# Patient Record
Sex: FEMALE | Employment: STUDENT | URBAN - METROPOLITAN AREA
[De-identification: names, ages, dates, MRNs, and addresses within clinical notes are randomized per-mention and may not be internally consistent; named-entity substitution may affect disease eponyms.]

---
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
## 2019-01-04 DIAGNOSIS — Z30.41 ENCOUNTER FOR SURVEILLANCE OF CONTRACEPTIVE PILLS: ICD-10-CM

## 2019-01-07 ENCOUNTER — TELEPHONE (OUTPATIENT)
Dept: FAMILY MEDICINE CLINIC | Facility: CLINIC | Age: 19
End: 2019-01-07

## 2019-01-07 NOTE — TELEPHONE ENCOUNTER
[facility] for patient's mother. Advised of Dr. Kamilla Wu of care and that there is no value in adding on Mono blood test.  Advised to call and schedule appointment for her daughter for this week to address all issues including her prescription.

## 2019-01-07 NOTE — TELEPHONE ENCOUNTER
Dr. Fadia Montelongo, Please advise if repeat mono test is needed    LAST LAB 6/18/18  Mono test positive    Notes recorded by Izabella Catherine MD on 6/19/2018 at 7:16 AM CDT  Pt has positive mono test but it was also positive 2 years ago, so it could be a false

## 2019-01-07 NOTE — TELEPHONE ENCOUNTER
Patient had to cancel annual physical this morning due to throwing up and being sick, mom called and stated that patient will be needing refill on birth control and goes back to school on Sunday, so will need before.  Also stated that patient needs iron and

## 2019-01-07 NOTE — TELEPHONE ENCOUNTER
Mono antibody tests are \"once positive, always positive. \" There's no recovery test. When symptoms resolve they are considered cured. It's probably best to address blood test orders at a visit. Can we get her in for a half hour slot anytime this week?

## 2019-01-08 RX ORDER — NORETHINDRONE ACETATE/ETHINYL ESTRADIOL AND FERROUS FUMARATE 1MG-20(21)
KIT ORAL
Qty: 28 TABLET | Refills: 0 | OUTPATIENT
Start: 2019-01-08

## 2019-01-08 NOTE — TELEPHONE ENCOUNTER
Will give refill at appt    Future Appointments   Date Time Provider Michael Lawrence   1/10/2019 11:00 AM Janie Vergara MD [facility] [facility]

## 2019-01-10 ENCOUNTER — OFFICE VISIT (OUTPATIENT)
Dept: FAMILY MEDICINE CLINIC | Facility: CLINIC | Age: 19
End: 2019-01-10

## 2019-01-10 VITALS
RESPIRATION RATE: 16 BRPM | SYSTOLIC BLOOD PRESSURE: 118 MMHG | WEIGHT: 175.19 LBS | OXYGEN SATURATION: 97 % | DIASTOLIC BLOOD PRESSURE: 68 MMHG | HEART RATE: 91 BPM | BODY MASS INDEX: 29.91 KG/M2 | TEMPERATURE: 98 F | HEIGHT: 64 IN

## 2019-01-10 DIAGNOSIS — Z30.41 ENCOUNTER FOR SURVEILLANCE OF CONTRACEPTIVE PILLS: ICD-10-CM

## 2019-01-10 DIAGNOSIS — Z23 NEED FOR VACCINATION: ICD-10-CM

## 2019-01-10 DIAGNOSIS — Z13.1 SCREENING FOR DIABETES MELLITUS: ICD-10-CM

## 2019-01-10 DIAGNOSIS — R53.82 CHRONIC FATIGUE AND MALAISE: ICD-10-CM

## 2019-01-10 DIAGNOSIS — Z11.3 ROUTINE SCREENING FOR STI (SEXUALLY TRANSMITTED INFECTION): ICD-10-CM

## 2019-01-10 DIAGNOSIS — D50.9 IRON DEFICIENCY ANEMIA, UNSPECIFIED IRON DEFICIENCY ANEMIA TYPE: ICD-10-CM

## 2019-01-10 DIAGNOSIS — R53.81 CHRONIC FATIGUE AND MALAISE: ICD-10-CM

## 2019-01-10 DIAGNOSIS — Z13.0 SCREENING FOR DEFICIENCY ANEMIA: Primary | ICD-10-CM

## 2019-01-10 DIAGNOSIS — Z13.29 SCREENING FOR THYROID DISORDER: ICD-10-CM

## 2019-01-10 PROCEDURE — 99395 PREV VISIT EST AGE 18-39: CPT | Performed by: FAMILY MEDICINE

## 2019-01-10 PROCEDURE — 90471 IMMUNIZATION ADMIN: CPT | Performed by: FAMILY MEDICINE

## 2019-01-10 PROCEDURE — 90686 IIV4 VACC NO PRSV 0.5 ML IM: CPT | Performed by: FAMILY MEDICINE

## 2019-01-10 RX ORDER — NORETHINDRONE ACETATE AND ETHINYL ESTRADIOL 1MG-20(21)
1 KIT ORAL
Qty: 3 PACKAGE | Refills: 3 | Status: SHIPPED | OUTPATIENT
Start: 2019-01-10 | End: 2019-12-12

## 2019-01-10 NOTE — PATIENT INSTRUCTIONS
Health screening: Pap recommended beginning at age 24, then if normal every 3 years. HPV vaccine recommended if under 26. STD screening: if sexually active, chlamydia and gonorrhea testing recommended if single and/or under age 22. Ordered.  Contraception

## 2019-01-10 NOTE — PROGRESS NOTES
Chloe Greene is a 22year old female here for Patient presents with: Well Adult      HPI:   Patient complains of here for well exam.     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      Took iron pills for awh Asked        Blood Transfusions: Not Asked        Caffeine Concern: Yes          1 cup coffee daily         Occupational Exposure: No        Hobby Hazards: No        Sleep Concern: Yes          averages 4-5 hours sleep during week, more weekends        Str panic attacks.       EXAM:       /68 (BP Location: Right arm, Patient Position: Sitting, Cuff Size: adult)   Pulse 91   Temp 98.3 °F (36.8 °C) (Oral)   Resp 16   Ht 64\"   Wt 175 lb 3.2 oz   LMP 01/07/2019 (Exact Date)   SpO2 97%   BMI 30.07 kg/m²   B of exercise weekly are recommended. Try to reduce pop and juices, snacks. 250 calories less intake than you burn up can result in 1/2 pound/week loss. Habits:   To avoid harmful effects on health, recommend not to take more than 1 alcoholic drink (12 oz bee

## 2019-01-11 ENCOUNTER — LAB ENCOUNTER (OUTPATIENT)
Dept: LAB | Age: 19
End: 2019-01-11
Attending: FAMILY MEDICINE
Payer: COMMERCIAL

## 2019-01-11 DIAGNOSIS — D50.9 IRON DEFICIENCY ANEMIA, UNSPECIFIED IRON DEFICIENCY ANEMIA TYPE: ICD-10-CM

## 2019-01-11 DIAGNOSIS — Z13.29 SCREENING FOR THYROID DISORDER: ICD-10-CM

## 2019-01-11 DIAGNOSIS — Z11.3 ROUTINE SCREENING FOR STI (SEXUALLY TRANSMITTED INFECTION): ICD-10-CM

## 2019-01-11 DIAGNOSIS — E61.1 IRON DEFICIENCY: ICD-10-CM

## 2019-01-11 DIAGNOSIS — Z13.1 SCREENING FOR DIABETES MELLITUS: ICD-10-CM

## 2019-01-11 DIAGNOSIS — R53.81 CHRONIC FATIGUE AND MALAISE: ICD-10-CM

## 2019-01-11 DIAGNOSIS — Z13.0 SCREENING FOR DEFICIENCY ANEMIA: ICD-10-CM

## 2019-01-11 DIAGNOSIS — R53.82 CHRONIC FATIGUE AND MALAISE: ICD-10-CM

## 2019-01-11 LAB
ALBUMIN SERPL-MCNC: 3.6 G/DL (ref 3.1–4.5)
ALBUMIN/GLOB SERPL: 1 {RATIO} (ref 1–2)
ALP LIVER SERPL-CCNC: 76 U/L (ref 52–144)
ALT SERPL-CCNC: 16 U/L (ref 14–54)
ANION GAP SERPL CALC-SCNC: 7 MMOL/L (ref 0–18)
AST SERPL-CCNC: 12 U/L (ref 15–41)
BASOPHILS # BLD AUTO: 0.04 X10(3) UL (ref 0–0.1)
BASOPHILS NFR BLD AUTO: 0.6 %
BILIRUB SERPL-MCNC: 0.4 MG/DL (ref 0.1–2)
BUN BLD-MCNC: 7 MG/DL (ref 8–20)
BUN/CREAT SERPL: 9.7 (ref 10–20)
CALCIUM BLD-MCNC: 9 MG/DL (ref 8.3–10.3)
CHLORIDE SERPL-SCNC: 107 MMOL/L (ref 101–111)
CO2 SERPL-SCNC: 26 MMOL/L (ref 22–32)
CREAT BLD-MCNC: 0.72 MG/DL (ref 0.55–1.02)
EOSINOPHIL # BLD AUTO: 0.08 X10(3) UL (ref 0–0.3)
EOSINOPHIL NFR BLD AUTO: 1.3 %
ERYTHROCYTE [DISTWIDTH] IN BLOOD BY AUTOMATED COUNT: 12.1 % (ref 11.5–16)
GLOBULIN PLAS-MCNC: 3.5 G/DL (ref 2.8–4.4)
GLUCOSE BLD-MCNC: 94 MG/DL (ref 70–99)
HCT VFR BLD AUTO: 45.1 % (ref 34–50)
HGB BLD-MCNC: 14.4 G/DL (ref 12–16)
IMMATURE GRANULOCYTE COUNT: 0.04 X10(3) UL (ref 0–1)
IMMATURE GRANULOCYTE RATIO %: 0.6 %
IRON SATURATION: 8 % (ref 15–50)
IRON: 41 UG/DL (ref 28–170)
LYMPHOCYTES # BLD AUTO: 1.25 X10(3) UL (ref 0.9–4)
LYMPHOCYTES NFR BLD AUTO: 20.3 %
M PROTEIN MFR SERPL ELPH: 7.1 G/DL (ref 6.4–8.2)
MCH RBC QN AUTO: 27.9 PG (ref 27–33.2)
MCHC RBC AUTO-ENTMCNC: 31.9 G/DL (ref 31–37)
MCV RBC AUTO: 87.2 FL (ref 81–100)
MONOCYTES # BLD AUTO: 0.74 X10(3) UL (ref 0.1–1)
MONOCYTES NFR BLD AUTO: 12 %
NEUTROPHIL ABS PRELIM: 4.01 X10 (3) UL (ref 1.3–6.7)
NEUTROPHILS # BLD AUTO: 4.01 X10(3) UL (ref 1.3–6.7)
NEUTROPHILS NFR BLD AUTO: 65.2 %
OSMOLALITY SERPL CALC.SUM OF ELEC: 288 MOSM/KG (ref 275–295)
PLATELET # BLD AUTO: 251 10(3)UL (ref 150–450)
POTASSIUM SERPL-SCNC: 4.2 MMOL/L (ref 3.6–5.1)
RBC # BLD AUTO: 5.17 X10(6)UL (ref 3.8–5.1)
RED CELL DISTRIBUTION WIDTH-SD: 39.1 FL (ref 35.1–46.3)
SODIUM SERPL-SCNC: 140 MMOL/L (ref 136–144)
TOTAL IRON BINDING CAPACITY: 489 UG/DL (ref 240–450)
TRANSFERRIN SERPL-MCNC: 328 MG/DL (ref 200–360)
TSI SER-ACNC: 0.46 MIU/ML (ref 0.35–5.5)
WBC # BLD AUTO: 6.2 X10(3) UL (ref 4–13)

## 2019-01-11 PROCEDURE — 83540 ASSAY OF IRON: CPT

## 2019-01-11 PROCEDURE — 87591 N.GONORRHOEAE DNA AMP PROB: CPT

## 2019-01-11 PROCEDURE — 85025 COMPLETE CBC W/AUTO DIFF WBC: CPT

## 2019-01-11 PROCEDURE — 87491 CHLMYD TRACH DNA AMP PROBE: CPT

## 2019-01-11 PROCEDURE — 80053 COMPREHEN METABOLIC PANEL: CPT

## 2019-01-11 PROCEDURE — 84443 ASSAY THYROID STIM HORMONE: CPT

## 2019-01-11 PROCEDURE — 83550 IRON BINDING TEST: CPT

## 2019-01-11 PROCEDURE — 36415 COLL VENOUS BLD VENIPUNCTURE: CPT

## 2019-01-13 LAB
C TRACH DNA SPEC QL NAA+PROBE: NEGATIVE
N GONORRHOEA DNA SPEC QL NAA+PROBE: NEGATIVE

## 2019-02-03 ENCOUNTER — NURSE ONLY (OUTPATIENT)
Dept: FAMILY MEDICINE CLINIC | Facility: CLINIC | Age: 19
End: 2019-02-03

## 2019-02-03 VITALS
HEART RATE: 88 BPM | RESPIRATION RATE: 20 BRPM | TEMPERATURE: 98 F | SYSTOLIC BLOOD PRESSURE: 124 MMHG | HEIGHT: 64 IN | DIASTOLIC BLOOD PRESSURE: 70 MMHG | OXYGEN SATURATION: 98 % | BODY MASS INDEX: 29.71 KG/M2 | WEIGHT: 174 LBS

## 2019-02-03 DIAGNOSIS — J06.9 VIRAL URI WITH COUGH: Primary | ICD-10-CM

## 2019-02-03 PROCEDURE — 99213 OFFICE O/P EST LOW 20 MIN: CPT | Performed by: NURSE PRACTITIONER

## 2019-02-03 RX ORDER — AMOXICILLIN AND CLAVULANATE POTASSIUM 875; 125 MG/1; MG/1
1 TABLET, FILM COATED ORAL 2 TIMES DAILY
Qty: 20 TABLET | Refills: 0 | Status: SHIPPED | OUTPATIENT
Start: 2019-02-03 | End: 2019-02-13

## 2019-02-03 RX ORDER — FLUTICASONE PROPIONATE 50 MCG
2 SPRAY, SUSPENSION (ML) NASAL DAILY
Qty: 1 BOTTLE | Refills: 0 | Status: SHIPPED | OUTPATIENT
Start: 2019-02-03 | End: 2019-06-27

## 2019-02-03 NOTE — PROGRESS NOTES
CHIEF COMPLAINT:   Patient presents with:  Cough: X about 1 week   Nasal Congestion: stuffy and runny nose, sinus pressure, X about 1 week       HPI:   Jewel Krishna is a 22year old female who presents for upper respiratory symptoms for  1 weeks.  Joby /70   Pulse 88   Temp 98.4 °F (36.9 °C) (Oral)   Resp 20   Ht 64\"   Wt 174 lb   LMP 01/07/2019 (Exact Date)   SpO2 98%   BMI 29.87 kg/m²   GENERAL: well developed, well nourished, and in no apparent distress, afebrile  SKIN: no rashes, no suspicious The sinuses are air-filled spaces within the bones of the face. They connect to the inside of the nose. Sinusitis is an inflammation of the tissue that lines the sinuses.  Sinusitis can occur during a cold. It can also happen due to allergies to pollens and · Use acetaminophen or ibuprofen to control pain, unless another pain medicine was prescribed to you. If you have chronic liver or kidney disease or ever had a stomach ulcer, talk with your healthcare provider before using these medicines.  (Aspirin should

## 2019-03-19 ENCOUNTER — OFFICE VISIT (OUTPATIENT)
Dept: URGENT CARE | Age: 19
End: 2019-03-19

## 2019-03-19 VITALS
OXYGEN SATURATION: 99 % | WEIGHT: 170 LBS | HEART RATE: 110 BPM | HEIGHT: 64 IN | RESPIRATION RATE: 18 BRPM | TEMPERATURE: 98.4 F | BODY MASS INDEX: 29.02 KG/M2

## 2019-03-19 DIAGNOSIS — J06.9 VIRAL URI WITH COUGH: Primary | ICD-10-CM

## 2019-03-19 PROCEDURE — 99203 OFFICE O/P NEW LOW 30 MIN: CPT | Performed by: PHYSICIAN ASSISTANT

## 2019-03-19 RX ORDER — METHYLPREDNISOLONE 4 MG/1
4 TABLET ORAL SEE ADMIN INSTRUCTIONS
Qty: 21 TABLET | Refills: 0 | Status: SHIPPED | OUTPATIENT
Start: 2019-03-19 | End: 2019-10-16 | Stop reason: ALTCHOICE

## 2019-03-19 RX ORDER — BENZONATATE 100 MG/1
100 CAPSULE ORAL 3 TIMES DAILY PRN
Qty: 30 CAPSULE | Refills: 0 | Status: SHIPPED | OUTPATIENT
Start: 2019-03-19

## 2019-03-19 ASSESSMENT — ENCOUNTER SYMPTOMS
COUGH: 1
SPUTUM PRODUCTION: 0
SORE THROAT: 0
EYES NEGATIVE: 1
FEVER: 0
SINUS PAIN: 1
SHORTNESS OF BREATH: 0
WEAKNESS: 0

## 2019-03-26 RX ORDER — AZITHROMYCIN 250 MG/1
TABLET, FILM COATED ORAL
Qty: 6 TABLET | Refills: 0 | Status: SHIPPED | OUTPATIENT
Start: 2019-03-26 | End: 2019-03-31

## 2019-06-19 ENCOUNTER — TELEPHONE (OUTPATIENT)
Dept: FAMILY MEDICINE CLINIC | Facility: CLINIC | Age: 19
End: 2019-06-19

## 2019-06-19 NOTE — TELEPHONE ENCOUNTER
Patients mother called and requested a blood test for iron to be ordered again, patient is still having problems with tiredness, also requesting vitamin D to be checked?  Please return call

## 2019-06-27 ENCOUNTER — OFFICE VISIT (OUTPATIENT)
Dept: FAMILY MEDICINE CLINIC | Facility: CLINIC | Age: 19
End: 2019-06-27

## 2019-06-27 VITALS
HEART RATE: 104 BPM | WEIGHT: 169 LBS | HEIGHT: 64 IN | SYSTOLIC BLOOD PRESSURE: 112 MMHG | DIASTOLIC BLOOD PRESSURE: 70 MMHG | BODY MASS INDEX: 28.85 KG/M2

## 2019-06-27 DIAGNOSIS — L98.9 SKIN LESION OF LEFT LEG: ICD-10-CM

## 2019-06-27 DIAGNOSIS — L81.9 ATYPICAL PIGMENTED SKIN LESION: Primary | ICD-10-CM

## 2019-06-27 DIAGNOSIS — S29.011A PECTORALIS MUSCLE STRAIN, INITIAL ENCOUNTER: ICD-10-CM

## 2019-06-27 DIAGNOSIS — L98.9 SKIN LESION OF LEFT ARM: ICD-10-CM

## 2019-06-27 PROCEDURE — 99213 OFFICE O/P EST LOW 20 MIN: CPT | Performed by: FAMILY MEDICINE

## 2019-06-27 NOTE — PROGRESS NOTES
Teresa Harrell IS A 20year old female HERE FOR Patient presents with:  Lesion: one on left forearm, left shin and right inner thigh has tried OTC wart freeze which did not help.    Other: states that she has been having left side pain, when she moves her u file      Highest education level: Not on file    Occupational History      Not on file    Social Needs      Financial resource strain: Not on file      Food insecurity:        Worry: Not on file        Inability: Not on file      Transportation needs: finance major. Enjoys architecture.        Review of systems:      review: due for 2nd varicella (never received)  HPV vaccine    Exam:     /70 (BP Location: Left arm, Patient Position: Sitting, Cuff Size: adult)   Pulse 104   Ht 64\"   Wt 169 lb Neutrophil Absolute 01/11/2019 4.01    • Lymphocyte Absolute 01/11/2019 1.25    • Monocyte Absolute 01/11/2019 0.74    • Eosinophil Absolute 01/11/2019 0.08    • Basophil Absolute 01/11/2019 0.04    • Immature Granulocyte Abs* 01/11/2019 0.04    • Neutroph

## 2019-06-27 NOTE — PATIENT INSTRUCTIONS
FOr chest wall pain: avoid aggravating movements with pushing, pulling, pushups, heavy lifting. Try to do more with right side/arm for 2 weeks. Ibuprofen over the counter, 2 pills every 6-8 hours, or Aleve 1 to 2 pills every 6-8 hours, should help.  Conside

## 2019-06-27 NOTE — PROGRESS NOTES
Raysa Farris IS A 20year old female HERE FOR Patient presents with:  Lesion: one on left forearm, left shin and right inner thigh has tried OTC wart freeze which did not help.    Other: states that she has been having left side pain, when she moves her u control/protection: OCP    Lifestyle      Physical activity:        Days per week: Not on file        Minutes per session: Not on file      Stress: Not on file    Relationships      Social connections:        Talks on phone: Not on file        Charles short

## 2019-07-12 ENCOUNTER — LAB ENCOUNTER (OUTPATIENT)
Dept: LAB | Age: 19
End: 2019-07-12
Attending: FAMILY MEDICINE
Payer: COMMERCIAL

## 2019-07-12 DIAGNOSIS — Z00.00 ROUTINE PHYSICAL EXAMINATION: ICD-10-CM

## 2019-07-12 DIAGNOSIS — R53.83 FATIGUE: Primary | ICD-10-CM

## 2019-07-12 DIAGNOSIS — D64.9 ANEMIA: ICD-10-CM

## 2019-07-12 LAB
ALBUMIN SERPL-MCNC: 3.7 G/DL (ref 3.4–5)
ALBUMIN/GLOB SERPL: 1.1 {RATIO} (ref 1–2)
ALP LIVER SERPL-CCNC: 59 U/L (ref 52–144)
ALT SERPL-CCNC: 16 U/L (ref 13–56)
ANION GAP SERPL CALC-SCNC: 3 MMOL/L (ref 0–18)
AST SERPL-CCNC: 17 U/L (ref 15–37)
BASOPHILS # BLD AUTO: 0.05 X10(3) UL (ref 0–0.2)
BASOPHILS NFR BLD AUTO: 0.8 %
BILIRUB SERPL-MCNC: 0.5 MG/DL (ref 0.1–2)
BUN BLD-MCNC: 10 MG/DL (ref 7–18)
BUN/CREAT SERPL: 12.5 (ref 10–20)
CALCIUM BLD-MCNC: 8.8 MG/DL (ref 8.5–10.1)
CHLORIDE SERPL-SCNC: 110 MMOL/L (ref 98–112)
CHOLEST SMN-MCNC: 155 MG/DL (ref ?–200)
CO2 SERPL-SCNC: 27 MMOL/L (ref 21–32)
CREAT BLD-MCNC: 0.8 MG/DL (ref 0.55–1.02)
DEPRECATED HBV CORE AB SER IA-ACNC: 21.6 NG/ML (ref 12–114)
DEPRECATED RDW RBC AUTO: 41.1 FL (ref 35.1–46.3)
EOSINOPHIL # BLD AUTO: 0.19 X10(3) UL (ref 0–0.7)
EOSINOPHIL NFR BLD AUTO: 3 %
ERYTHROCYTE [DISTWIDTH] IN BLOOD BY AUTOMATED COUNT: 12.9 % (ref 11–15)
GLOBULIN PLAS-MCNC: 3.4 G/DL (ref 2.8–4.4)
GLUCOSE BLD-MCNC: 88 MG/DL (ref 70–99)
HCT VFR BLD AUTO: 44.2 % (ref 35–48)
HDLC SERPL-MCNC: 58 MG/DL (ref 40–59)
HGB BLD-MCNC: 14.5 G/DL (ref 12–16)
IMM GRANULOCYTES # BLD AUTO: 0.02 X10(3) UL (ref 0–1)
IMM GRANULOCYTES NFR BLD: 0.3 %
IRON SATURATION: 15 % (ref 15–50)
IRON SERPL-MCNC: 77 UG/DL (ref 50–170)
LDLC SERPL CALC-MCNC: 81 MG/DL (ref ?–100)
LYMPHOCYTES # BLD AUTO: 1.9 X10(3) UL (ref 1–4)
LYMPHOCYTES NFR BLD AUTO: 29.5 %
M PROTEIN MFR SERPL ELPH: 7.1 G/DL (ref 6.4–8.2)
MCH RBC QN AUTO: 28.6 PG (ref 26–34)
MCHC RBC AUTO-ENTMCNC: 32.8 G/DL (ref 31–37)
MCV RBC AUTO: 87.2 FL (ref 80–100)
MONOCYTES # BLD AUTO: 0.62 X10(3) UL (ref 0.1–1)
MONOCYTES NFR BLD AUTO: 9.6 %
NEUTROPHILS # BLD AUTO: 3.66 X10 (3) UL (ref 1.5–7.7)
NEUTROPHILS # BLD AUTO: 3.66 X10(3) UL (ref 1.5–7.7)
NEUTROPHILS NFR BLD AUTO: 56.8 %
NONHDLC SERPL-MCNC: 97 MG/DL (ref ?–130)
OSMOLALITY SERPL CALC.SUM OF ELEC: 288 MOSM/KG (ref 275–295)
PLATELET # BLD AUTO: 274 10(3)UL (ref 150–450)
POTASSIUM SERPL-SCNC: 4.4 MMOL/L (ref 3.5–5.1)
RBC # BLD AUTO: 5.07 X10(6)UL (ref 3.8–5.3)
SODIUM SERPL-SCNC: 140 MMOL/L (ref 136–145)
T3FREE SERPL-MCNC: 2.45 PG/ML (ref 2.4–4.2)
T4 FREE SERPL-MCNC: 1 NG/DL (ref 0.8–1.7)
TOTAL IRON BINDING CAPACITY: 508 UG/DL (ref 240–450)
TRANSFERRIN SERPL-MCNC: 341 MG/DL (ref 200–360)
TRIGL SERPL-MCNC: 82 MG/DL (ref 30–149)
TSI SER-ACNC: 1.51 MIU/ML (ref 0.36–3.74)
VIT B12 SERPL-MCNC: 412 PG/ML (ref 193–986)
VIT D+METAB SERPL-MCNC: 35.3 NG/ML (ref 30–100)
VLDLC SERPL CALC-MCNC: 16 MG/DL (ref 0–30)
WBC # BLD AUTO: 6.4 X10(3) UL (ref 4–11)

## 2019-07-12 PROCEDURE — 82306 VITAMIN D 25 HYDROXY: CPT

## 2019-07-12 PROCEDURE — 85025 COMPLETE CBC W/AUTO DIFF WBC: CPT

## 2019-07-12 PROCEDURE — 83540 ASSAY OF IRON: CPT

## 2019-07-12 PROCEDURE — 84481 FREE ASSAY (FT-3): CPT

## 2019-07-12 PROCEDURE — 84439 ASSAY OF FREE THYROXINE: CPT

## 2019-07-12 PROCEDURE — 84443 ASSAY THYROID STIM HORMONE: CPT

## 2019-07-12 PROCEDURE — 83550 IRON BINDING TEST: CPT

## 2019-07-12 PROCEDURE — 82607 VITAMIN B-12: CPT

## 2019-07-12 PROCEDURE — 80053 COMPREHEN METABOLIC PANEL: CPT

## 2019-07-12 PROCEDURE — 36415 COLL VENOUS BLD VENIPUNCTURE: CPT

## 2019-07-12 PROCEDURE — 80061 LIPID PANEL: CPT

## 2019-07-12 PROCEDURE — 82728 ASSAY OF FERRITIN: CPT

## 2019-07-26 NOTE — TELEPHONE ENCOUNTER
Notes recorded by Mira Hernandez MD on 6/19/2018 at 7:16 AM CDT  Pt has positive mono test but it was also positive 2 years ago, so it could be a false prolonged positive. Iron is low.  We could do more formal testing for Opal-Barr virus to see if sh
Patient called back in followup of her mother's call yesterday. Patient states she continues to have a worsening sore throat and wants a prescription for either an antibiotic or something else that may help.   In review of the notes from her mother's phone
Patient's mom called. Stated she called yesterday. Calling to find out about recent Test Results from last Labs that were re-done.   (Do not see a TE from yesterday to add this note to)    Please call Patient's Mom.     [address] [facility] [address]
Spoke with mother, advised of results and that we had spoken with daughter    She is feeling better after steroids and antibiotics but still fatigued    Mother wasn't aware of advice to start iron supplements and need to recheck levels in 3 months    Will
Spoke with pt and advised of Rx's to the pharmacy    She will start today and also let mom know that we contacted her with this info
Defer instrumental testing at this time. May consider MBS after re-evaluation, if warranted
Defer at this time - will re-assess upon next re-evaluation
defer instrumental testing as Pt not appropriate due to reduced respiratory status
VFSS/MBS/MBS scheduled for 7/26 in the a.m.

## 2019-10-16 ENCOUNTER — OFFICE VISIT (OUTPATIENT)
Dept: URGENT CARE | Age: 19
End: 2019-10-16

## 2019-10-16 VITALS
TEMPERATURE: 98.4 F | OXYGEN SATURATION: 98 % | HEART RATE: 114 BPM | BODY MASS INDEX: 25.61 KG/M2 | RESPIRATION RATE: 18 BRPM | WEIGHT: 150 LBS | HEIGHT: 64 IN

## 2019-10-16 DIAGNOSIS — J02.9 SORE THROAT: ICD-10-CM

## 2019-10-16 DIAGNOSIS — J02.9 PHARYNGITIS, UNSPECIFIED ETIOLOGY: Primary | ICD-10-CM

## 2019-10-16 LAB
INTERNAL PROCEDURAL CONTROLS ACCEPTABLE: YES
S PYO AG THROAT QL IA.RAPID: NEGATIVE

## 2019-10-16 PROCEDURE — 99214 OFFICE O/P EST MOD 30 MIN: CPT | Performed by: PHYSICIAN ASSISTANT

## 2019-10-16 PROCEDURE — 87880 STREP A ASSAY W/OPTIC: CPT | Performed by: PHYSICIAN ASSISTANT

## 2019-10-16 RX ORDER — METHYLPREDNISOLONE 4 MG/1
4 TABLET ORAL SEE ADMIN INSTRUCTIONS
Qty: 21 TABLET | Refills: 0 | Status: SHIPPED | OUTPATIENT
Start: 2019-10-16

## 2019-10-16 RX ORDER — NORGESTIMATE AND ETHINYL ESTRADIOL 7DAYSX3 LO
1 KIT ORAL DAILY
COMMUNITY

## 2019-10-16 ASSESSMENT — ENCOUNTER SYMPTOMS
HEADACHES: 1
FEVER: 0
CHILLS: 0
SINUS PAIN: 0
WEIGHT LOSS: 0
SORE THROAT: 1
RESPIRATORY NEGATIVE: 1
WEAKNESS: 0

## 2020-01-17 PROCEDURE — 99283 EMERGENCY DEPT VISIT LOW MDM: CPT | Performed by: NURSE PRACTITIONER

## 2020-09-11 ENCOUNTER — HOSPITAL ENCOUNTER (EMERGENCY)
Age: 20
Discharge: HOME | End: 2020-09-11
Attending: EMERGENCY MEDICINE
Payer: COMMERCIAL

## 2020-09-11 ENCOUNTER — APPOINTMENT (OUTPATIENT)
Dept: GENERAL RADIOLOGY | Age: 20
End: 2020-09-11
Attending: PHYSICIAN ASSISTANT
Payer: COMMERCIAL

## 2020-09-11 VITALS
TEMPERATURE: 99 F | WEIGHT: 160 LBS | DIASTOLIC BLOOD PRESSURE: 84 MMHG | BODY MASS INDEX: 27.31 KG/M2 | SYSTOLIC BLOOD PRESSURE: 138 MMHG | RESPIRATION RATE: 16 BRPM | OXYGEN SATURATION: 98 % | HEART RATE: 85 BPM | HEIGHT: 64 IN

## 2020-09-11 DIAGNOSIS — U07.1 COVID-19: Primary | ICD-10-CM

## 2020-09-11 LAB
ATRIAL RATE: 95 BPM
P AXIS: 46 DEGREES
P-R INTERVAL: 154 MS
Q-T INTERVAL: 354 MS
QRS DURATION: 84 MS
QTC CALCULATION (BEZET): 444 MS
R AXIS: 83 DEGREES
T AXIS: 40 DEGREES
VENTRICULAR RATE: 95 BPM

## 2020-09-11 PROCEDURE — 99284 EMERGENCY DEPT VISIT MOD MDM: CPT

## 2020-09-11 PROCEDURE — 93010 ELECTROCARDIOGRAM REPORT: CPT

## 2020-09-11 PROCEDURE — 93005 ELECTROCARDIOGRAM TRACING: CPT

## 2020-09-11 PROCEDURE — 96360 HYDRATION IV INFUSION INIT: CPT

## 2020-09-11 PROCEDURE — 71045 X-RAY EXAM CHEST 1 VIEW: CPT | Performed by: PHYSICIAN ASSISTANT

## 2020-09-11 PROCEDURE — 99285 EMERGENCY DEPT VISIT HI MDM: CPT

## 2020-09-11 RX ORDER — FLUOXETINE HYDROCHLORIDE 20 MG/1
20 CAPSULE ORAL DAILY
COMMUNITY

## 2020-09-11 NOTE — ED INITIAL ASSESSMENT (HPI)
Tested positive for Covid on Tuesday, started having chest pain yesterday, left side pain, comes and goes, tightness and shortness of breath are constant but the sharp pain comes and goes, nausea, headache, body aches and pains

## 2020-09-11 NOTE — ED PROVIDER NOTES
Patient Seen in: THE [facility] In [place]      History   Patient presents with:  Chest Pain Angina    Stated Complaint: chest pain and sob +covid test    HPI    CHIEF COMPLAINT: Recently tested positive for COVID, has some intermittent c Review of Systems    Positive for stated complaint: chest pain and sob +covid test  Other systems are as noted in HPI. Constitutional and vital signs reviewed. All other systems reviewed and negative except as noted above.     Physical Exam ischemia              Xr Chest Ap Portable  (cpt=71045)    Result Date: 9/11/2020  PROCEDURE:  XR CHEST AP PORTABLE  (CPT=71045)  TECHNIQUE:  AP chest radiograph was obtained.   COMPARISON:  [facility], XR, CHEST PA   LATERAL, 1/25/2012, 11:27 AM.  INDICATI

## 2020-09-11 NOTE — ED PROVIDER NOTES
I reviewed that chart and discussed the case. I have examined the patient and noted 24-year-old female that presents for evaluation of sternal chest pain, cough who is positive for COVID. Patient tested positive on Tuesday for [place].   She had a known exp

## 2021-12-29 ENCOUNTER — EKG ENCOUNTER (OUTPATIENT)
Dept: LAB | Age: 21
End: 2021-12-29
Attending: FAMILY MEDICINE
Payer: COMMERCIAL

## 2021-12-29 ENCOUNTER — LAB ENCOUNTER (OUTPATIENT)
Dept: LAB | Age: 21
End: 2021-12-29
Attending: FAMILY MEDICINE
Payer: COMMERCIAL

## 2021-12-29 DIAGNOSIS — Z00.00 PHYSICAL EXAM: Primary | ICD-10-CM

## 2021-12-29 DIAGNOSIS — E55.9 VITAMIN D DEFICIENCY: ICD-10-CM

## 2021-12-29 LAB
ALBUMIN SERPL-MCNC: 3.7 G/DL (ref 3.4–5)
ALBUMIN/GLOB SERPL: 1.2 {RATIO} (ref 1–2)
ALP LIVER SERPL-CCNC: 81 U/L
ALT SERPL-CCNC: 18 U/L
ANION GAP SERPL CALC-SCNC: 6 MMOL/L (ref 0–18)
AST SERPL-CCNC: 11 U/L (ref 15–37)
BASOPHILS # BLD AUTO: 0.06 X10(3) UL (ref 0–0.2)
BASOPHILS NFR BLD AUTO: 0.7 %
BILIRUB SERPL-MCNC: 0.4 MG/DL (ref 0.1–2)
BILIRUB UR QL STRIP.AUTO: NEGATIVE
BUN BLD-MCNC: 8 MG/DL (ref 7–18)
CALCIUM BLD-MCNC: 9.1 MG/DL (ref 8.5–10.1)
CHLORIDE SERPL-SCNC: 105 MMOL/L (ref 98–112)
CHOLEST SERPL-MCNC: 215 MG/DL (ref ?–200)
CLARITY UR REFRACT.AUTO: CLEAR
CO2 SERPL-SCNC: 28 MMOL/L (ref 21–32)
COLOR UR AUTO: YELLOW
CREAT BLD-MCNC: 0.75 MG/DL
EOSINOPHIL # BLD AUTO: 0.19 X10(3) UL (ref 0–0.7)
EOSINOPHIL NFR BLD AUTO: 2.3 %
ERYTHROCYTE [DISTWIDTH] IN BLOOD BY AUTOMATED COUNT: 13.5 %
FASTING PATIENT LIPID ANSWER: YES
FASTING STATUS PATIENT QL REPORTED: YES
GLOBULIN PLAS-MCNC: 3.2 G/DL (ref 2.8–4.4)
GLUCOSE BLD-MCNC: 97 MG/DL (ref 70–99)
GLUCOSE UR STRIP.AUTO-MCNC: NEGATIVE MG/DL
HCT VFR BLD AUTO: 44.9 %
HDLC SERPL-MCNC: 87 MG/DL (ref 40–59)
HGB BLD-MCNC: 14.3 G/DL
IMM GRANULOCYTES # BLD AUTO: 0.07 X10(3) UL (ref 0–1)
IMM GRANULOCYTES NFR BLD: 0.8 %
KETONES UR STRIP.AUTO-MCNC: NEGATIVE MG/DL
LDLC SERPL CALC-MCNC: 112 MG/DL (ref ?–100)
LEUKOCYTE ESTERASE UR QL STRIP.AUTO: NEGATIVE
LYMPHOCYTES # BLD AUTO: 1.56 X10(3) UL (ref 1–4)
LYMPHOCYTES NFR BLD AUTO: 18.5 %
MCH RBC QN AUTO: 27.6 PG (ref 26–34)
MCHC RBC AUTO-ENTMCNC: 31.8 G/DL (ref 31–37)
MCV RBC AUTO: 86.7 FL
MONOCYTES # BLD AUTO: 0.7 X10(3) UL (ref 0.1–1)
MONOCYTES NFR BLD AUTO: 8.3 %
NEUTROPHILS # BLD AUTO: 5.85 X10 (3) UL (ref 1.5–7.7)
NEUTROPHILS # BLD AUTO: 5.85 X10(3) UL (ref 1.5–7.7)
NEUTROPHILS NFR BLD AUTO: 69.4 %
NITRITE UR QL STRIP.AUTO: NEGATIVE
NONHDLC SERPL-MCNC: 128 MG/DL (ref ?–130)
OSMOLALITY SERPL CALC.SUM OF ELEC: 286 MOSM/KG (ref 275–295)
PH UR STRIP.AUTO: 5 [PH] (ref 5–8)
PLATELET # BLD AUTO: 283 10(3)UL (ref 150–450)
POTASSIUM SERPL-SCNC: 4.4 MMOL/L (ref 3.5–5.1)
PROT SERPL-MCNC: 6.9 G/DL (ref 6.4–8.2)
PROT UR STRIP.AUTO-MCNC: NEGATIVE MG/DL
RBC # BLD AUTO: 5.18 X10(6)UL
RBC UR QL AUTO: NEGATIVE
SODIUM SERPL-SCNC: 139 MMOL/L (ref 136–145)
SP GR UR STRIP.AUTO: 1.02 (ref 1–1.03)
T3FREE SERPL-MCNC: 3.25 PG/ML (ref 2.4–4.2)
T4 FREE SERPL-MCNC: 0.8 NG/DL (ref 0.8–1.7)
TRIGL SERPL-MCNC: 93 MG/DL (ref 30–149)
TSI SER-ACNC: 1.66 MIU/ML (ref 0.36–3.74)
UROBILINOGEN UR STRIP.AUTO-MCNC: <2 MG/DL
VIT D+METAB SERPL-MCNC: 16 NG/ML (ref 30–100)
VLDLC SERPL CALC-MCNC: 16 MG/DL (ref 0–30)
WBC # BLD AUTO: 8.4 X10(3) UL (ref 4–11)

## 2021-12-29 PROCEDURE — 84443 ASSAY THYROID STIM HORMONE: CPT

## 2021-12-29 PROCEDURE — 36415 COLL VENOUS BLD VENIPUNCTURE: CPT

## 2021-12-29 PROCEDURE — 93005 ELECTROCARDIOGRAM TRACING: CPT

## 2021-12-29 PROCEDURE — 84481 FREE ASSAY (FT-3): CPT

## 2021-12-29 PROCEDURE — 80053 COMPREHEN METABOLIC PANEL: CPT

## 2021-12-29 PROCEDURE — 81003 URINALYSIS AUTO W/O SCOPE: CPT

## 2021-12-29 PROCEDURE — 80061 LIPID PANEL: CPT

## 2021-12-29 PROCEDURE — 85025 COMPLETE CBC W/AUTO DIFF WBC: CPT

## 2021-12-29 PROCEDURE — 82306 VITAMIN D 25 HYDROXY: CPT

## 2021-12-29 PROCEDURE — 93010 ELECTROCARDIOGRAM REPORT: CPT | Performed by: INTERNAL MEDICINE

## 2021-12-29 PROCEDURE — 84439 ASSAY OF FREE THYROXINE: CPT

## 2022-01-03 ENCOUNTER — HOSPITAL ENCOUNTER (OUTPATIENT)
Dept: CV DIAGNOSTICS | Age: 22
Discharge: HOME | End: 2022-01-03
Attending: FAMILY MEDICINE
Payer: COMMERCIAL

## 2022-01-03 DIAGNOSIS — U09.9 POST COVID-19 CONDITION, UNSPECIFIED: ICD-10-CM

## 2022-01-03 DIAGNOSIS — I10 ELEVATED HEART RATE WITH ELEVATED BLOOD PRESSURE AND DIAGNOSIS OF HYPERTENSION: ICD-10-CM

## 2022-01-03 DIAGNOSIS — R00.9 ELEVATED HEART RATE WITH ELEVATED BLOOD PRESSURE AND DIAGNOSIS OF HYPERTENSION: ICD-10-CM

## 2022-01-03 PROCEDURE — 93306 TTE W/DOPPLER COMPLETE: CPT | Performed by: FAMILY MEDICINE

## 2022-01-11 LAB
P AXIS: 48 DEGREES
P-R INTERVAL: 158 MS
Q-T INTERVAL: 358 MS
QRS DURATION: 82 MS
QTC CALCULATION (BEZET): 424 MS
R AXIS: 87 DEGREES
T AXIS: 56 DEGREES
VENTRICULAR RATE: 84 BPM

## 2022-06-11 ENCOUNTER — OFFICE VISIT (OUTPATIENT)
Dept: FAMILY MEDICINE CLINIC | Facility: CLINIC | Age: 22
End: 2022-06-11
Payer: COMMERCIAL

## 2022-06-11 VITALS
HEART RATE: 98 BPM | DIASTOLIC BLOOD PRESSURE: 86 MMHG | RESPIRATION RATE: 16 BRPM | SYSTOLIC BLOOD PRESSURE: 112 MMHG | OXYGEN SATURATION: 98 % | BODY MASS INDEX: 35 KG/M2 | WEIGHT: 205 LBS | HEIGHT: 64 IN | TEMPERATURE: 98 F

## 2022-06-11 DIAGNOSIS — J02.9 SORE THROAT: Primary | ICD-10-CM

## 2022-06-11 LAB
CONTROL LINE PRESENT WITH A CLEAR BACKGROUND (YES/NO): YES YES/NO
KIT LOT #: NORMAL NUMERIC
STREP GRP A CUL-SCR: NEGATIVE

## 2022-06-11 PROCEDURE — 3079F DIAST BP 80-89 MM HG: CPT | Performed by: NURSE PRACTITIONER

## 2022-06-11 PROCEDURE — 99213 OFFICE O/P EST LOW 20 MIN: CPT | Performed by: NURSE PRACTITIONER

## 2022-06-11 PROCEDURE — 87081 CULTURE SCREEN ONLY: CPT | Performed by: NURSE PRACTITIONER

## 2022-06-11 PROCEDURE — 3074F SYST BP LT 130 MM HG: CPT | Performed by: NURSE PRACTITIONER

## 2022-06-11 PROCEDURE — 3008F BODY MASS INDEX DOCD: CPT | Performed by: NURSE PRACTITIONER

## 2022-06-11 PROCEDURE — 87880 STREP A ASSAY W/OPTIC: CPT | Performed by: NURSE PRACTITIONER

## 2022-06-12 LAB — SARS-COV-2 RNA RESP QL NAA+PROBE: NOT DETECTED

## 2023-06-05 ENCOUNTER — LAB ENCOUNTER (OUTPATIENT)
Dept: LAB | Age: 23
End: 2023-06-05
Attending: PHYSICIAN ASSISTANT
Payer: COMMERCIAL

## 2023-06-05 DIAGNOSIS — Z00.00 ROUTINE GENERAL MEDICAL EXAMINATION AT A HEALTH CARE FACILITY: Primary | ICD-10-CM

## 2023-06-05 DIAGNOSIS — R53.83 FATIGUE: ICD-10-CM

## 2023-06-05 LAB
ALBUMIN SERPL-MCNC: 3.9 G/DL (ref 3.4–5)
ALBUMIN/GLOB SERPL: 1.3 {RATIO} (ref 1–2)
ALP LIVER SERPL-CCNC: 72 U/L
ALT SERPL-CCNC: 19 U/L
ANION GAP SERPL CALC-SCNC: 3 MMOL/L (ref 0–18)
AST SERPL-CCNC: 15 U/L (ref 15–37)
BASOPHILS # BLD AUTO: 0.05 X10(3) UL (ref 0–0.2)
BASOPHILS NFR BLD AUTO: 0.8 %
BILIRUB SERPL-MCNC: 0.7 MG/DL (ref 0.1–2)
BUN BLD-MCNC: 8 MG/DL (ref 7–18)
CALCIUM BLD-MCNC: 8.9 MG/DL (ref 8.5–10.1)
CHLORIDE SERPL-SCNC: 108 MMOL/L (ref 98–112)
CHOLEST SERPL-MCNC: 134 MG/DL (ref ?–200)
CO2 SERPL-SCNC: 26 MMOL/L (ref 21–32)
CREAT BLD-MCNC: 0.66 MG/DL
EOSINOPHIL # BLD AUTO: 0.24 X10(3) UL (ref 0–0.7)
EOSINOPHIL NFR BLD AUTO: 3.8 %
ERYTHROCYTE [DISTWIDTH] IN BLOOD BY AUTOMATED COUNT: 13.2 %
FASTING PATIENT LIPID ANSWER: YES
FASTING STATUS PATIENT QL REPORTED: YES
FOLATE SERPL-MCNC: 16.8 NG/ML (ref 8.7–?)
GFR SERPLBLD BASED ON 1.73 SQ M-ARVRAT: 126 ML/MIN/1.73M2 (ref 60–?)
GLOBULIN PLAS-MCNC: 3.1 G/DL (ref 2.8–4.4)
GLUCOSE BLD-MCNC: 106 MG/DL (ref 70–99)
HCT VFR BLD AUTO: 44 %
HDLC SERPL-MCNC: 61 MG/DL (ref 40–59)
HGB BLD-MCNC: 14.2 G/DL
IMM GRANULOCYTES # BLD AUTO: 0.02 X10(3) UL (ref 0–1)
IMM GRANULOCYTES NFR BLD: 0.3 %
INSULIN SERPL-ACNC: 6.1 MU/L (ref 3–25)
LDLC SERPL CALC-MCNC: 50 MG/DL (ref ?–100)
LYMPHOCYTES # BLD AUTO: 1.42 X10(3) UL (ref 1–4)
LYMPHOCYTES NFR BLD AUTO: 22.6 %
MCH RBC QN AUTO: 28.1 PG (ref 26–34)
MCHC RBC AUTO-ENTMCNC: 32.3 G/DL (ref 31–37)
MCV RBC AUTO: 87 FL
MONOCYTES # BLD AUTO: 0.43 X10(3) UL (ref 0.1–1)
MONOCYTES NFR BLD AUTO: 6.9 %
NEUTROPHILS # BLD AUTO: 4.11 X10 (3) UL (ref 1.5–7.7)
NEUTROPHILS # BLD AUTO: 4.11 X10(3) UL (ref 1.5–7.7)
NEUTROPHILS NFR BLD AUTO: 65.6 %
NONHDLC SERPL-MCNC: 73 MG/DL (ref ?–130)
OSMOLALITY SERPL CALC.SUM OF ELEC: 283 MOSM/KG (ref 275–295)
PLATELET # BLD AUTO: 280 10(3)UL (ref 150–450)
POTASSIUM SERPL-SCNC: 3.9 MMOL/L (ref 3.5–5.1)
PROT SERPL-MCNC: 7 G/DL (ref 6.4–8.2)
RBC # BLD AUTO: 5.06 X10(6)UL
SODIUM SERPL-SCNC: 137 MMOL/L (ref 136–145)
T3FREE SERPL-MCNC: 2.61 PG/ML (ref 2.4–4.2)
T4 FREE SERPL-MCNC: 0.9 NG/DL (ref 0.8–1.7)
TRIGL SERPL-MCNC: 135 MG/DL (ref 30–149)
TSI SER-ACNC: 1.74 MIU/ML (ref 0.36–3.74)
VIT B12 SERPL-MCNC: 357 PG/ML (ref 193–986)
VIT D+METAB SERPL-MCNC: 29.4 NG/ML (ref 30–100)
VLDLC SERPL CALC-MCNC: 19 MG/DL (ref 0–30)
WBC # BLD AUTO: 6.3 X10(3) UL (ref 4–11)

## 2023-06-05 PROCEDURE — 84443 ASSAY THYROID STIM HORMONE: CPT

## 2023-06-05 PROCEDURE — 84439 ASSAY OF FREE THYROXINE: CPT

## 2023-06-05 PROCEDURE — 82746 ASSAY OF FOLIC ACID SERUM: CPT

## 2023-06-05 PROCEDURE — 85025 COMPLETE CBC W/AUTO DIFF WBC: CPT

## 2023-06-05 PROCEDURE — 80053 COMPREHEN METABOLIC PANEL: CPT

## 2023-06-05 PROCEDURE — 83525 ASSAY OF INSULIN: CPT

## 2023-06-05 PROCEDURE — 82306 VITAMIN D 25 HYDROXY: CPT

## 2023-06-05 PROCEDURE — 82607 VITAMIN B-12: CPT

## 2023-06-05 PROCEDURE — 36415 COLL VENOUS BLD VENIPUNCTURE: CPT

## 2023-06-05 PROCEDURE — 80061 LIPID PANEL: CPT

## 2023-06-05 PROCEDURE — 84481 FREE ASSAY (FT-3): CPT

## 2024-05-19 ENCOUNTER — OFFICE VISIT (OUTPATIENT)
Dept: FAMILY MEDICINE CLINIC | Facility: CLINIC | Age: 24
End: 2024-05-19

## 2024-05-19 VITALS
WEIGHT: 177 LBS | DIASTOLIC BLOOD PRESSURE: 80 MMHG | BODY MASS INDEX: 30.22 KG/M2 | OXYGEN SATURATION: 99 % | RESPIRATION RATE: 16 BRPM | TEMPERATURE: 98 F | SYSTOLIC BLOOD PRESSURE: 118 MMHG | HEART RATE: 90 BPM | HEIGHT: 64 IN

## 2024-05-19 DIAGNOSIS — J06.9 VIRAL URI: ICD-10-CM

## 2024-05-19 DIAGNOSIS — H61.23 BILATERAL IMPACTED CERUMEN: Primary | ICD-10-CM

## 2024-05-19 NOTE — PROGRESS NOTES
CHIEF COMPLAINT:     Chief Complaint   Patient presents with    Ear Problem     X 3 weeks  Sx: left ear clogged, congestion       HPI:   Brandy Ybarra is a 23 year old female who presents for left ear feeling clogged on and off for about 1 month. Has hx of cerumen impaction. Leaving the country this week Thursday and hoping sxs can be resolved by then.   Sinus congestion and runny nose started 3 days ago. No fever. Treating with sudafed. Requesting zpack.     Current Outpatient Medications   Medication Sig Dispense Refill    FLUoxetine HCl 20 MG Oral Cap Take 20 mg by mouth daily.        Past Medical History:    Anxiety      No past surgical history on file.      Social History     Socioeconomic History    Marital status: Single   Tobacco Use    Smoking status: Never    Smokeless tobacco: Never   Vaping Use    Vaping status: Never Used   Substance and Sexual Activity    Alcohol use: Yes     Alcohol/week: 0.0 standard drinks of alcohol     Comment: Occ     Drug use: No    Sexual activity: Yes     Partners: Male     Birth control/protection: OCP   Other Topics Concern    Caffeine Concern Yes     Comment: 1 cup coffee daily     Occupational Exposure No    Hobby Hazards No    Sleep Concern Yes     Comment: averages 4-5 hours sleep during week, more weekends    Stress Concern Yes     Comment: activity at school     Weight Concern No    Special Diet No    Exercise No     Comment: 3-4x weekly Cardio & weight lifting    Social History Narrative    Freshman in college. Plans dance competition, in [employer], finance major. Enjoys architecture.          REVIEW OF SYSTEMS:   GENERAL: normal appetite. No fever.   SKIN: no rashes or abnormal skin lesions  HEENT: See HPI  LUNGS: denies shortness of breath or wheezing, See HPI  CARDIOVASCULAR: denies chest pain or palpitations   GI: denies N/V/C or abdominal pain  NEURO: Denies dizziness or weakness    EXAM:   /80   Pulse 90   Temp 98.1 °F (36.7 °C)   Resp 16   Ht 5' 4\"  (1.626 m)   Wt 177 lb (80.3 kg)   SpO2 99%   BMI 30.38 kg/m²   GENERAL: well developed, well nourished,in no apparent distress  SKIN: no rashes,no suspicious lesions  HEAD: atraumatic, normocephalic.  no tenderness on palpation of  sinuses  EYES: conjunctiva clear, EOM intact  EARS: Bilateral EAC's occluded with hard cerumen. TM's not visualized.   NOSE: Nostrils patent, clear nasal discharge, nasal mucosa erythematous   THROAT: Oral mucosa pink, moist. Posterior pharynx is non erythematous. no exudates. Tonsils 1/4.    NECK: Supple, non-tender  LUNGS: clear to auscultation bilaterally, no wheezes or rhonchi. Breathing is non labored.  CARDIO: RRR without murmur  EXTREMITIES: no cyanosis, clubbing or edema  LYMPH:  no lymphadenopathy.        ASSESSMENT AND PLAN:   Brandy Ybarra is a 23 year old female who presents with upper respiratory symptoms that are consistent with    ASSESSMENT:   Encounter Diagnoses   Name Primary?    Bilateral impacted cerumen Yes    Viral URI          PLAN:   Cerumen impaction: Cerumen very hard, unable to be removed with curette. Attempted irrigation of left ear with water and peroxide mixture, unable to remove cerumen.   Recommended debrox at home. May return to clinic in 3 days to attempt irrigation again or follow up with ENT this week.     Viral uri: on day 4 of symptoms. Advised likely viral etiology. Discussed comfort measures. Advised follow up for new or worsening symptoms.       Meds & Refills for this Visit:  Requested Prescriptions      No prescriptions requested or ordered in this encounter       Risks, benefits, and side effects of medication explained and discussed.    Patient Instructions   Debrox drops over the counter to soften wax  Follow up with ENT or return to clinic in 3 days to attempt removal again.     Push fluids and rest  Decongestant if needed  Follow up for new or worsening symptoms or if not improving over the next 5-7 days.     The patient indicates  understanding of these issues and agrees to the plan.  The patient is asked to return if sx's persist or worsen.

## 2024-05-19 NOTE — PATIENT INSTRUCTIONS
Debrox drops over the counter to soften wax  Follow up with ENT or return to clinic in 3 days to attempt removal again.     Push fluids and rest  Decongestant if needed  Follow up for new or worsening symptoms or if not improving over the next 5-7 days.

## 2024-06-07 ENCOUNTER — LAB ENCOUNTER (OUTPATIENT)
Dept: LAB | Age: 24
End: 2024-06-07
Attending: OBSTETRICS & GYNECOLOGY
Payer: COMMERCIAL

## 2024-06-07 ENCOUNTER — TELEPHONE (OUTPATIENT)
Facility: CLINIC | Age: 24
End: 2024-06-07

## 2024-06-07 ENCOUNTER — OFFICE VISIT (OUTPATIENT)
Facility: CLINIC | Age: 24
End: 2024-06-07
Payer: COMMERCIAL

## 2024-06-07 VITALS
DIASTOLIC BLOOD PRESSURE: 80 MMHG | HEART RATE: 108 BPM | BODY MASS INDEX: 30.63 KG/M2 | SYSTOLIC BLOOD PRESSURE: 120 MMHG | HEIGHT: 64 IN | WEIGHT: 179.38 LBS

## 2024-06-07 DIAGNOSIS — Z11.3 SCREEN FOR STD (SEXUALLY TRANSMITTED DISEASE): ICD-10-CM

## 2024-06-07 DIAGNOSIS — Z01.419 WELL WOMAN EXAM WITH ROUTINE GYNECOLOGICAL EXAM: Primary | ICD-10-CM

## 2024-06-07 DIAGNOSIS — N64.4 MASTALGIA: ICD-10-CM

## 2024-06-07 DIAGNOSIS — N92.6 IRREGULAR MENSES: ICD-10-CM

## 2024-06-07 DIAGNOSIS — Z12.4 PAPANICOLAOU SMEAR FOR CERVICAL CANCER SCREENING: ICD-10-CM

## 2024-06-07 LAB
DHEA-S SERPL-MCNC: 340.4 UG/DL
PROLACTIN SERPL-MCNC: 6.6 NG/ML
TESTOST SERPL-MCNC: 73.75 NG/DL
TSI SER-ACNC: 1.11 MIU/ML (ref 0.36–3.74)

## 2024-06-07 PROCEDURE — 87491 CHLMYD TRACH DNA AMP PROBE: CPT | Performed by: OBSTETRICS & GYNECOLOGY

## 2024-06-07 PROCEDURE — 99385 PREV VISIT NEW AGE 18-39: CPT | Performed by: OBSTETRICS & GYNECOLOGY

## 2024-06-07 PROCEDURE — 87389 HIV-1 AG W/HIV-1&-2 AB AG IA: CPT

## 2024-06-07 PROCEDURE — 87591 N.GONORRHOEAE DNA AMP PROB: CPT | Performed by: OBSTETRICS & GYNECOLOGY

## 2024-06-07 PROCEDURE — 3079F DIAST BP 80-89 MM HG: CPT | Performed by: OBSTETRICS & GYNECOLOGY

## 2024-06-07 PROCEDURE — 84146 ASSAY OF PROLACTIN: CPT

## 2024-06-07 PROCEDURE — 84403 ASSAY OF TOTAL TESTOSTERONE: CPT

## 2024-06-07 PROCEDURE — 86592 SYPHILIS TEST NON-TREP QUAL: CPT

## 2024-06-07 PROCEDURE — 3008F BODY MASS INDEX DOCD: CPT | Performed by: OBSTETRICS & GYNECOLOGY

## 2024-06-07 PROCEDURE — 88175 CYTOPATH C/V AUTO FLUID REDO: CPT | Performed by: OBSTETRICS & GYNECOLOGY

## 2024-06-07 PROCEDURE — 3074F SYST BP LT 130 MM HG: CPT | Performed by: OBSTETRICS & GYNECOLOGY

## 2024-06-07 PROCEDURE — 82627 DEHYDROEPIANDROSTERONE: CPT

## 2024-06-07 PROCEDURE — 84443 ASSAY THYROID STIM HORMONE: CPT

## 2024-06-07 PROCEDURE — 84143 ASSAY OF 17-HYDROXYPREGNENO: CPT

## 2024-06-07 PROCEDURE — 36415 COLL VENOUS BLD VENIPUNCTURE: CPT

## 2024-06-07 RX ORDER — DROSPIRENONE AND ETHINYL ESTRADIOL 0.02-3(28)
1 KIT ORAL DAILY
Qty: 28 TABLET | Refills: 11 | Status: SHIPPED | OUTPATIENT
Start: 2024-06-07 | End: 2025-06-07

## 2024-06-07 NOTE — TELEPHONE ENCOUNTER
Pt request for cancer screening per     Family history of cancer:      Cancer Mother            breast 45     Cancer Maternal Grandmother           breast     Hypertension Maternal Grandfather           unknown    Cancer Paternal Grandmother           breast    Heart Attack Paternal Grandfather           unknown    Cancer Maternal Cousin 65         breast cancer     Patient's name and  verified on specimen tube with patient. [facility] cancer screen lab drawn, patient tolerated well. Specimen placed at . Call office in 2 weeks for result. Patient verbalized understanding.

## 2024-06-07 NOTE — PROGRESS NOTES
Brandy Ybarra is a 23 year old female  Patient's last menstrual period was 2024 (exact date).   Chief Complaint   Patient presents with    Wellness Visit    Gyn Problem     Pt c/o left breast pains.   .     She complains of some left lower inner breast tenderness it sharp it last for seconds it is not associated with her.  She has no discharge from her nipples.  She has a strong family history of breast cancer her sister was tested for genetic breast cancers and were negative she wishes to be tested.  Will order a diagnostic left mammogram and ultrasound    She is using condoms.  She thinks she got the Gardiselle vaccination she takes vitamin D.  Her periods are somewhat irregular she will skip 2 weeks.  She only bleeds for 3 days.  No bleeding between she has not been tested for thyroid.  She wishes STD testing.    OBSTETRICS HISTORY:  OB History    Para Term  AB Living   0 0 0 0 0 0   SAB IAB Ectopic Multiple Live Births   0 0 0 0 0       GYNE HISTORY:  Periods regular monthly    History   Sexual Activity    Sexual activity: Yes    Partners: Male    Birth control/ protection: OCP, Condom        Hx Prior Abnormal Pap: No  Follow Up Recommendation: Last pap smear per pt, , normal.        MEDICAL HISTORY:  Past Medical History:    Anxiety       SURGICAL HISTORY:  History reviewed. No pertinent surgical history.    SOCIAL HISTORY:  Social History     Socioeconomic History    Marital status: Single     Spouse name: Not on file    Number of children: Not on file    Years of education: Not on file    Highest education level: Not on file   Occupational History    Not on file   Tobacco Use    Smoking status: Never    Smokeless tobacco: Never   Vaping Use    Vaping status: Some Days    Substances: Nicotine    Devices: Disposable   Substance and Sexual Activity    Alcohol use: Yes     Alcohol/week: 0.0 standard drinks of alcohol     Comment: Occ     Drug use: No    Sexual activity: Yes      Partners: Male     Birth control/protection: OCP, Condom   Other Topics Concern     Service Not Asked    Blood Transfusions Not Asked    Caffeine Concern Yes     Comment: 1 cup coffee daily     Occupational Exposure No    Hobby Hazards No    Sleep Concern Yes     Comment: averages 4-5 hours sleep during week, more weekends    Stress Concern Yes     Comment: activity at school     Weight Concern No    Special Diet No    Back Care Not Asked    Exercise No     Comment: 3-4x weekly Cardio & weight lifting     Bike Helmet Not Asked    Seat Belt Not Asked    Self-Exams Not Asked   Social History Narrative    Freshman in college. Plans dance competition, in [employer], finance major. Enjoys architecture.      Social Determinants of Health     Financial Resource Strain: Not on file   Food Insecurity: Not on file   Transportation Needs: Not on file   Physical Activity: Not on file   Stress: Not on file   Social Connections: Not on file   Housing Stability: Not on file       FAMILY HISTORY:  Family History   Problem Relation Age of Onset    Cancer Mother         breast 45     Cancer Maternal Grandmother         breast     Hypertension Maternal Grandfather         unknown    Cancer Paternal Grandmother         breast    Heart Attack Paternal Grandfather         unknown    Cancer Maternal Cousin 65        breast cancer       MEDICATIONS:    Current Outpatient Medications:     Magnesium 100 MG Oral Tab, Take by mouth., Disp: , Rfl:     Cholecalciferol (VITAMIN D3) 25 MCG (1000 UT) Oral Cap, Take 1 tablet by mouth daily., Disp: , Rfl:     Probiotic Product (CULTURELLE PROBIOTICS OR), Take by mouth., Disp: , Rfl:     FLUoxetine HCl 20 MG Oral Cap, Take 1 capsule (20 mg total) by mouth daily., Disp: , Rfl:     ALLERGIES:    Allergies   Allergen Reactions    Seasonal          Review of Systems:  Constitutional:  Denies fatigue, night sweats, hot flashes  Gastrointestinal:  denies heartburn, abdominal pain, diarrhea or  constipation  Genitourinary:  denies dysuria, incontinence, abnormal vaginal discharge, vaginal itching  Skin/Breast:  Denies any breast pain, lumps, or discharge.   Neurological:  denies headaches, extremity weakness or numbness.      PHYSICAL EXAM:   Constitutional: well developed, well nourished  Head/Face: normocephalic  Neck/Thyroid: thyroid symmetric, no thyromegaly, no nodules, no adenopathy  Breast: normal without palpable masses, tenderness, asymmetry, nipple discharge, nipple retraction or skin changes  Abdomen:  soft, nontender, nondistended, no masses  Skin/Hair: no unusual rashes or bruises   Extremities: no edema, no cyanosis  Psychiatric:  Oriented to time, place, person and situation. Appropriate mood and affect    Pelvic Exam:  External Genitalia: normal appearance, hair distribution, and no lesions  Urethral Meatus:  normal in size, location, without lesions and prolapse  Bladder:  No fullness, masses or tenderness  Vagina:  Normal appearance without lesions, no abnormal discharge  Cervix:  Normal without tenderness on motion without lesions Pap.  Gen-Probe  Uterus: normal in size, contour, position, mobility, without tenderness  Adnexa: normal without masses or tenderness  Perineum: normal  Anus: no hemorroids     Assessment & Plan:  There are no diagnoses linked to this encounter.    Well woman exam.  STD testing.  Genetic hereditary cancer testing.  OCPs

## 2024-06-08 LAB — RPR SER QL: NONREACTIVE

## 2024-06-10 LAB
C TRACH DNA SPEC QL NAA+PROBE: NEGATIVE
N GONORRHOEA DNA SPEC QL NAA+PROBE: NEGATIVE

## 2024-06-11 LAB
.: NORMAL
.: NORMAL

## 2024-06-12 LAB — 17-OH PROGESTERONE: 35 NG/DL

## 2024-06-14 ENCOUNTER — TELEPHONE (OUTPATIENT)
Facility: CLINIC | Age: 24
End: 2024-06-14

## 2024-06-20 ENCOUNTER — TELEPHONE (OUTPATIENT)
Facility: CLINIC | Age: 24
End: 2024-06-20

## 2024-07-03 ENCOUNTER — HOSPITAL ENCOUNTER (OUTPATIENT)
Dept: MAMMOGRAPHY | Facility: HOSPITAL | Age: 24
Discharge: HOME | End: 2024-07-03
Attending: OBSTETRICS & GYNECOLOGY
Payer: COMMERCIAL

## 2024-07-03 DIAGNOSIS — N64.4 MASTALGIA: ICD-10-CM

## 2024-07-03 PROCEDURE — 76642 ULTRASOUND BREAST LIMITED: CPT | Performed by: OBSTETRICS & GYNECOLOGY

## 2024-09-03 ENCOUNTER — TELEPHONE (OUTPATIENT)
Facility: CLINIC | Age: 24
End: 2024-09-03

## 2024-09-04 NOTE — TELEPHONE ENCOUNTER
Spoke with patient. Patient received a letter from radiology advising her to reach out to us regarding her breast ultrasound. Per Dr. Watson's result note, the ultrasound was normal.     Patient is also inquiring about her birth control. Patient advised is has affected her mood and finds she feels exhausted since taking it. Patient is wonder if there is a different option or a different does she could try.     Routed to Dr. Watson to review.

## 2024-09-05 RX ORDER — LEVONORGESTREL AND ETHINYL ESTRADIOL 0.15-0.03
1 KIT ORAL DAILY
Qty: 28 TABLET | Refills: 12 | Status: SHIPPED | OUTPATIENT
Start: 2024-09-05 | End: 2025-09-05

## 2024-09-05 NOTE — TELEPHONE ENCOUNTER
Spoke with patient. Advised Dr. Watson will be sending prescription to pharmacy for new birth control \"Necon.\" Verified pharmacy with patient. Understanding verbalized. No other questions at this time.

## 2024-09-16 ENCOUNTER — OFFICE VISIT (OUTPATIENT)
Dept: FAMILY MEDICINE CLINIC | Facility: CLINIC | Age: 24
End: 2024-09-16
Payer: COMMERCIAL

## 2024-09-16 ENCOUNTER — TELEPHONE (OUTPATIENT)
Dept: FAMILY MEDICINE CLINIC | Facility: CLINIC | Age: 24
End: 2024-09-16

## 2024-09-16 VITALS
HEART RATE: 77 BPM | RESPIRATION RATE: 16 BRPM | SYSTOLIC BLOOD PRESSURE: 130 MMHG | OXYGEN SATURATION: 98 % | TEMPERATURE: 97 F | BODY MASS INDEX: 30.92 KG/M2 | WEIGHT: 181.13 LBS | DIASTOLIC BLOOD PRESSURE: 82 MMHG | HEIGHT: 64 IN

## 2024-09-16 DIAGNOSIS — R07.9 CHEST PAIN, UNSPECIFIED TYPE: Primary | ICD-10-CM

## 2024-09-16 DIAGNOSIS — Z13.220 SCREENING CHOLESTEROL LEVEL: ICD-10-CM

## 2024-09-16 DIAGNOSIS — F41.9 ANXIETY: ICD-10-CM

## 2024-09-16 DIAGNOSIS — Z12.83 SKIN EXAM FOR MALIGNANT NEOPLASM: ICD-10-CM

## 2024-09-16 RX ORDER — FLUOXETINE 10 MG/1
CAPSULE ORAL
Qty: 187 CAPSULE | Refills: 0 | Status: SHIPPED | OUTPATIENT
Start: 2024-09-16 | End: 2024-12-22

## 2024-09-16 NOTE — PROGRESS NOTES
Subjective:      Chief Complaint   Patient presents with    Establish Care     Would like to discuss skin cancer - states she is in the sun a lot and does go tanning a lot    Medication Follow-Up     Stopped Fluoxetine 20mg for about 1.5 months    Arm Pain     Left arm - states its chest to her arm - had US done on left breast and it was WNL per patient     HISTORY OF PRESENT ILLNESS  HPI  HPI obtained per patient report.  Brandy Ybarra is a pleasant 24 year old female presenting with several concerns.     She reports 6-8 months of left-sided chest pain. She reports L upper anterior chest pain radiating to her shoulder and down her L arm to the elbow. She denies any preceding injury. The pain has been worsening over time. She describes the pain as pressure-like but sometimes sharp. Exacerbating factors include deep inhalation and movement of her L arm. Pain is not brought on or exacerbated by exertion per se. Her gynecologist ordered a L breast US which was normal. She denies other associated symptoms including but not limited to SOB, wheezing, palpitations, dizziness, numbness, tingling, weakness.     She requests to resume her fluoxetine 20 mg. She felt well while taking this medication but ran out of her prescription 1.5 months ago.     She also inquires about seeing a Dermatologist for a screening dermatological exam. She reports that she has a significant history of sun exposure and tanning. She denies known FH of skin cancer.       PAST PATIENT HISTORY  Past Medical History:    Anxiety    Cancer screening    The Empower™ Multi-Cancer panel (40 genes) = Negative     History reviewed. No pertinent surgical history.    CURRENT MEDICATIONS  Outpatient Medications Marked as Taking for the 9/16/24 encounter (Office Visit) with Adonay Moya MD   Medication Sig Dispense Refill    BIOTIN OR Take by mouth.      FLUoxetine 10 MG Oral Cap Take 1 capsule (10 mg total) by mouth daily for 7 days, THEN 2 capsules (20 mg  total) daily. 187 capsule 0    Norethindrone-Eth Estradiol (NECON 1/35, 28,) 1-35 MG-MCG Oral Tab Take 1 tablet by mouth daily. 28 tablet 8    Magnesium 100 MG Oral Tab Take by mouth.      Cholecalciferol (VITAMIN D3) 25 MCG (1000 UT) Oral Cap Take 1 tablet by mouth daily.      Probiotic Product (CULTURELLE PROBIOTICS OR) Take by mouth.         HEALTH MAINTENANCE  Immunization History   Administered Date(s) Administered    Covid-19 Vaccine Moderna 100 mcg/0.5 ml 12/29/2021    Covid-19 Vaccine Pfizer 30 mcg/0.3 ml 04/12/2021, 05/03/2021    DTAP 08/17/1999, 10/20/1999, 01/19/2000, 09/13/2001    FLULAVAL 6 months & older 0.5 ml Prefilled syringe (83910) 01/10/2019    HEP B 01/19/2000, 05/31/2000, 12/15/2000    HIB 08/17/1999, 10/20/1999, 01/19/2000, 09/13/2001    IPV 08/17/1999, 10/20/1999, 09/13/2001, 08/19/2004    MMR 12/15/2000, 08/19/2004    Meningococcal-Menactra 08/15/2016    TDAP 08/16/2012    Varicella 08/19/2004       ALLERGIES AND DRUG REACTIONS  Allergies   Allergen Reactions    Seasonal        Family History   Problem Relation Age of Onset    Breast Cancer Mother     Cancer Mother         breast 45     Breast Cancer Maternal Grandmother     Cancer Maternal Grandmother         breast     Hypertension Maternal Grandfather         unknown    Breast Cancer Paternal Grandmother     Cancer Paternal Grandmother         breast    Heart Attack Paternal Grandfather         unknown    Cancer Maternal Cousin 65        breast cancer    Musculo-skelatal Disorder Sister      Social History     Socioeconomic History    Marital status: Single   Tobacco Use    Smoking status: Never    Smokeless tobacco: Never    Tobacco comments:     Infrequent vaping   Vaping Use    Vaping status: Some Days    Substances: Nicotine    Devices: Disposable   Substance and Sexual Activity    Alcohol use: Yes     Alcohol/week: 6.0 standard drinks of alcohol     Types: 3 Glasses of wine, 3 Cans of beer per week     Comment: Occ     Drug use: No     Sexual activity: Yes     Partners: Male     Birth control/protection: OCP, Condom   Other Topics Concern    Caffeine Concern No    Occupational Exposure No    Hobby Hazards No    Sleep Concern Yes     Comment: averages 4-5 hours sleep during week, more weekends    Stress Concern No    Weight Concern Yes    Special Diet No    Exercise Yes     Comment: 3-4 times per week    Seat Belt Yes     Comment: always   Social History Narrative    Freshman in college. Plans dance competition, in [employer], finance major. Enjoys architecture.        Review of Systems   Cardiovascular:  Positive for chest pain.   Musculoskeletal:  Positive for arthralgias.   Psychiatric/Behavioral:  The patient is nervous/anxious.    All other systems reviewed and are negative.         Objective:      /82   Pulse 77   Temp 97.3 °F (36.3 °C) (Temporal)   Resp 16   Ht 5' 4\" (1.626 m)   Wt 181 lb 2 oz (82.2 kg)   LMP 09/01/2024 (Exact Date)   SpO2 98%   BMI 31.09 kg/m²   Body mass index is 31.09 kg/m².    Physical Exam  Vitals reviewed.   Constitutional:       General: She is not in acute distress.     Appearance: She is not ill-appearing, toxic-appearing or diaphoretic.   HENT:      Head: Normocephalic and atraumatic.   Eyes:      General: No scleral icterus.        Right eye: No discharge.         Left eye: No discharge.      Extraocular Movements: Extraocular movements intact.      Conjunctiva/sclera: Conjunctivae normal.   Cardiovascular:      Rate and Rhythm: Normal rate and regular rhythm.      Heart sounds: Normal heart sounds.   Pulmonary:      Effort: Pulmonary effort is normal.      Breath sounds: Normal breath sounds.   Chest:      Chest wall: No tenderness.   Abdominal:      General: Abdomen is flat. Bowel sounds are normal. There is no distension.      Palpations: Abdomen is soft. There is no mass.      Tenderness: There is no abdominal tenderness. There is no guarding or rebound.      Hernia: No hernia is present.    Musculoskeletal:         General: No swelling, tenderness or deformity. Normal range of motion.      Cervical back: Neck supple.      Right lower leg: No edema.      Left lower leg: No edema.      Comments: LUE exam:  No erythema/edema/increased warmth/tenderness  ROM WNL  Neer's and Hawkin's signs negative  Empty can test negative  Internal/external rotation tests negative  Speed's test negative   Skin:     General: Skin is warm and dry.      Coloration: Skin is not jaundiced or pale.      Findings: No bruising, erythema or rash.   Neurological:      General: No focal deficit present.      Mental Status: She is alert and oriented to person, place, and time.            Assessment and Plan:      1. Chest pain, unspecified type (Primary)  -     EKG 12 Lead; Future; Expected date: 09/16/2024  -     CBC With Differential With Platelet; Future; Expected date: 09/16/2024  -     Comp Metabolic Panel (14); Future; Expected date: 09/16/2024  -     Lipid Panel; Future; Expected date: 09/16/2024  2. Anxiety  -     FLUoxetine HCl; Take 1 capsule (10 mg total) by mouth daily for 7 days, THEN 2 capsules (20 mg total) daily.  Dispense: 187 capsule; Refill: 0  3. Skin exam for malignant neoplasm  -     DERM - INTERNAL  4. Screening cholesterol level  -     Lipid Panel; Future; Expected date: 09/16/2024    Return in about 6 months (around 3/16/2025) for physical, follow-up.    CP  - more likely due to MSK etiology, but given the worsening nature of her pain and description of her pain as pressure, recommended an EKG for further evaluation  - updated annual lab orders were provided for her as well    Anxiety  - we will resume her prior dose of fluoxetine. Recommended taking fluoxetine 10 mg daily for 1 week, then titrating up to 20 mg daily    Skin exam  - recommended wearing sun screen and limiting direct sun/UV light exposure  - given her significant UV light exposure history, it is reasonable for her to undergo a baseline  screening dermatological exam with a dermatologist. Referral information was provided today    Patient verbalized understanding of assessment and recommendations. All questions and concerns were addressed.    Electronically signed by Adonay Moya MD

## 2024-09-16 NOTE — TELEPHONE ENCOUNTER
She said fluoxetine 20 mg; could you clarify the dose of phentermine she was taking then, because that medication does not have a 20 mg dose

## 2024-10-03 ENCOUNTER — EKG ENCOUNTER (OUTPATIENT)
Dept: LAB | Age: 24
End: 2024-10-03
Attending: STUDENT IN AN ORGANIZED HEALTH CARE EDUCATION/TRAINING PROGRAM
Payer: COMMERCIAL

## 2024-10-03 DIAGNOSIS — R07.9 CHEST PAIN, UNSPECIFIED TYPE: ICD-10-CM

## 2024-10-03 PROCEDURE — 93005 ELECTROCARDIOGRAM TRACING: CPT

## 2024-10-03 PROCEDURE — 93010 ELECTROCARDIOGRAM REPORT: CPT | Performed by: INTERNAL MEDICINE

## 2024-10-04 LAB
ATRIAL RATE: 72 BPM
P AXIS: 39 DEGREES
P-R INTERVAL: 156 MS
Q-T INTERVAL: 368 MS
QRS DURATION: 86 MS
QTC CALCULATION (BEZET): 402 MS
R AXIS: 76 DEGREES
T AXIS: 39 DEGREES
VENTRICULAR RATE: 72 BPM

## 2024-11-04 ENCOUNTER — OFFICE VISIT (OUTPATIENT)
Dept: DERMATOLOGY CLINIC | Facility: CLINIC | Age: 24
End: 2024-11-04

## 2024-11-04 DIAGNOSIS — D22.9 MULTIPLE BENIGN NEVI: ICD-10-CM

## 2024-11-04 DIAGNOSIS — L81.4 LENTIGINES: Primary | ICD-10-CM

## 2024-11-04 DIAGNOSIS — D18.01 CHERRY ANGIOMA: ICD-10-CM

## 2024-11-04 PROCEDURE — 99203 OFFICE O/P NEW LOW 30 MIN: CPT | Performed by: STUDENT IN AN ORGANIZED HEALTH CARE EDUCATION/TRAINING PROGRAM

## 2024-11-04 NOTE — PROGRESS NOTES
November 4, 2024    New patient     Referred by:   Adonay Moya MD  [address] [place] [address] [address]  [place] [address]     CHIEF COMPLAINT: FBSE    HISTORY OF PRESENT ILLNESS: .    - No particular lesions of concern.       DERM HISTORY:  History of skin cancer: No    FAMILY HISTORY:  History of melanoma: Yes, melanoma - not a first degree relative.     PAST MEDICAL HISTORY:  Past Medical History:    Anxiety    Cancer screening    The Empower™ Multi-Cancer panel (40 genes) = Negative       REVIEW OF SYSTEMS:  Constitutional: Denies fever, chills, unintentional weight loss.   Skin as per HPI    Medications  Current Outpatient Medications   Medication Sig Dispense Refill    Phentermine HCl 15 MG Oral Cap Take 1 capsule (15 mg total) by mouth every morning. 30 capsule 0    BIOTIN OR Take by mouth.      FLUoxetine 10 MG Oral Cap Take 1 capsule (10 mg total) by mouth daily for 7 days, THEN 2 capsules (20 mg total) daily. 187 capsule 0    Norethindrone-Eth Estradiol (NECON 1/35, 28,) 1-35 MG-MCG Oral Tab Take 1 tablet by mouth daily. 28 tablet 8    Magnesium 100 MG Oral Tab Take by mouth.      Cholecalciferol (VITAMIN D3) 25 MCG (1000 UT) Oral Cap Take 1 tablet by mouth daily.      Probiotic Product (CULTURELLE PROBIOTICS OR) Take by mouth.      FLUoxetine HCl 20 MG Oral Cap Take 1 capsule (20 mg total) by mouth daily. (Patient not taking: Reported on 9/16/2024)         PHYSICAL EXAM:  Patient declined chaperone   General: awake, alert, no acute distress  Skin: Skin exam was performed today including the following: head and face, scalp, neck, chest (including breasts and axillae), abdomen, back, bilateral upper extremities, bilateral lower extremities, hands, feet, digits, nails. Pertinent findings include:   - Scattered bright red-purple dome-shaped papules on the trunk and extremities   - Scattered light brown stellate macules on sun exposed sites  - Scattered, evenly colored, round brown macules and papules  with regular borders on the trunk and extremities      ASSESSMENT & PLAN:  Pathophysiology of diagnoses discussed with patient.  Therapeutic options reviewed. Risks, benefits, and alternatives discussed with patient. Instructions reviewed at length.    #Lentigines  #Cherry angiomas   - Reassurance provided regarding the benign nature of these lesions.    #Multiple benign nevi  - Complete skin exam performed today with no outlier lesions identified   - Reassured patient of benign nature of these lesions.   - Recommend daily photoprotection with broad-spectrum sunscreen, avoidance of sun during peak hours, and sun protective clothing.    - Dermoscopy was used for physical examination of pigmented lesions during today's office visit.      Return to clinic: 2-3 years  or sooner if something concerning arises     Marlo Doran MD

## 2024-11-07 ENCOUNTER — TELEMEDICINE (OUTPATIENT)
Dept: FAMILY MEDICINE CLINIC | Facility: CLINIC | Age: 24
End: 2024-11-07
Payer: COMMERCIAL

## 2024-11-07 DIAGNOSIS — E66.811 CLASS 1 OBESITY DUE TO EXCESS CALORIES WITHOUT SERIOUS COMORBIDITY WITH BODY MASS INDEX (BMI) OF 31.0 TO 31.9 IN ADULT: ICD-10-CM

## 2024-11-07 DIAGNOSIS — F41.9 ANXIETY: ICD-10-CM

## 2024-11-07 DIAGNOSIS — E66.09 CLASS 1 OBESITY DUE TO EXCESS CALORIES WITHOUT SERIOUS COMORBIDITY WITH BODY MASS INDEX (BMI) OF 31.0 TO 31.9 IN ADULT: ICD-10-CM

## 2024-11-07 PROCEDURE — 99214 OFFICE O/P EST MOD 30 MIN: CPT | Performed by: STUDENT IN AN ORGANIZED HEALTH CARE EDUCATION/TRAINING PROGRAM

## 2024-11-07 RX ORDER — FLUOXETINE 10 MG/1
CAPSULE ORAL
Qty: 187 CAPSULE | Refills: 0 | Status: SHIPPED | OUTPATIENT
Start: 2024-11-07 | End: 2025-02-12

## 2024-11-07 RX ORDER — PHENTERMINE HYDROCHLORIDE 30 MG/1
30 CAPSULE ORAL EVERY MORNING
Qty: 30 CAPSULE | Refills: 0 | Status: SHIPPED | OUTPATIENT
Start: 2024-11-07 | End: 2024-12-07

## 2024-11-07 RX ORDER — PHENTERMINE HYDROCHLORIDE 30 MG/1
30 CAPSULE ORAL EVERY MORNING
Qty: 30 CAPSULE | Refills: 0 | Status: SHIPPED | OUTPATIENT
Start: 2024-12-05 | End: 2025-01-04

## 2024-11-07 RX ORDER — PHENTERMINE HYDROCHLORIDE 30 MG/1
30 CAPSULE ORAL EVERY MORNING
Qty: 30 CAPSULE | Refills: 0 | Status: SHIPPED | OUTPATIENT
Start: 2025-01-03 | End: 2025-02-02

## 2024-11-07 NOTE — PROGRESS NOTES
Subjective:      No chief complaint on file.    HISTORY OF PRESENT ILLNESS  HPI  HPI obtained per patient report.  Brandy Ybarra is a pleasant 24 year old female presenting virtually for a follow-up.     She started her phentermine prescription about 2 weeks ago. She states that she has been tolerating this medication well without any side effects. She is down about 1-2 lbs since starting this medication. She is continuing to adhere to a healthy diet and has been exercising more (4-5 days per week).     She requests to resume her fluoxetine as well. Her anxiety symptoms have been exacerbated recently associated with familial stressors and the seasonal change. She notes that her anxiety symptoms began to worsen prior to resuming phentermine, so she does not believe this is an adverse effect of her phentermine medication. She was prescribed 20 mg daily in the past with good response.       PAST PATIENT HISTORY  Past Medical History:    Anxiety    Cancer screening    The Empower™ Multi-Cancer panel (40 genes) = Negative     No past surgical history on file.    CURRENT MEDICATIONS  Medications Taking[1]    HEALTH MAINTENANCE  Immunization History   Administered Date(s) Administered    Covid-19 Vaccine Moderna 100 mcg/0.5 ml 12/29/2021    Covid-19 Vaccine Pfizer 30 mcg/0.3 ml 04/12/2021, 05/03/2021    DTAP 08/17/1999, 10/20/1999, 01/19/2000, 09/13/2001    FLULAVAL 6 months & older 0.5 ml Prefilled syringe (22818) 01/10/2019    HEP B 01/19/2000, 05/31/2000, 12/15/2000    HIB 08/17/1999, 10/20/1999, 01/19/2000, 09/13/2001    IPV 08/17/1999, 10/20/1999, 09/13/2001, 08/19/2004    MMR 12/15/2000, 08/19/2004    Meningococcal-Menactra 08/15/2016    TDAP 08/16/2012    Varicella 08/19/2004       ALLERGIES AND DRUG REACTIONS  Allergies[2]    Family History   Problem Relation Age of Onset    Breast Cancer Mother     Cancer Mother         breast 45     Breast Cancer Maternal Grandmother     Cancer Maternal Grandmother          breast     Hypertension Maternal Grandfather         unknown    Breast Cancer Paternal Grandmother     Cancer Paternal Grandmother         breast    Heart Attack Paternal Grandfather         unknown    Cancer Maternal Cousin 65        breast cancer    Musculo-skelatal Disorder Sister      Social History     Socioeconomic History    Marital status: Single   Tobacco Use    Smoking status: Never    Smokeless tobacco: Never    Tobacco comments:     Infrequent vaping   Vaping Use    Vaping status: Some Days    Substances: Nicotine    Devices: Disposable   Substance and Sexual Activity    Alcohol use: Yes     Alcohol/week: 6.0 standard drinks of alcohol     Types: 3 Glasses of wine, 3 Cans of beer per week     Comment: Occ     Drug use: No    Sexual activity: Yes     Partners: Male     Birth control/protection: OCP, Condom   Other Topics Concern    Caffeine Concern No    Occupational Exposure No    Hobby Hazards No    Sleep Concern Yes     Comment: averages 4-5 hours sleep during week, more weekends    Stress Concern No    Weight Concern Yes    Special Diet No    Exercise Yes     Comment: 3-4 times per week    Seat Belt Yes     Comment: always    Grew up on a farm No    History of tanning Yes    Outdoor occupation No    Breast feeding No    Reaction to local anesthetic No    Pt has a pacemaker No    Pt has a defibrillator No   Social History Narrative    Freshman in college. Plans dance competition, in sorority, finance major. Enjoys architecture.        Review of Systems   Psychiatric/Behavioral:  The patient is nervous/anxious.    All other systems reviewed and are negative.         Objective:      LMP 09/01/2024 (Exact Date)   There is no height or weight on file to calculate BMI.    Physical Exam  Constitutional:       General: She is not in acute distress.     Appearance: She is not ill-appearing or toxic-appearing.   HENT:      Head: Normocephalic and atraumatic.   Pulmonary:      Effort: Pulmonary effort is normal.    Musculoskeletal:      Cervical back: Neck supple.   Neurological:      Mental Status: She is alert and oriented to person, place, and time.            Assessment and Plan:      1. Body mass index 31.0-31.9, adult (Primary)  -     Phentermine HCl; Take 1 capsule (30 mg total) by mouth every morning.  Dispense: 30 capsule; Refill: 0  -     Phentermine HCl; Take 1 capsule (30 mg total) by mouth every morning.  Dispense: 30 capsule; Refill: 0  -     Phentermine HCl; Take 1 capsule (30 mg total) by mouth every morning.  Dispense: 30 capsule; Refill: 0  2. Class 1 obesity due to excess calories without serious comorbidity with body mass index (BMI) of 31.0 to 31.9 in adult  -     Phentermine HCl; Take 1 capsule (30 mg total) by mouth every morning.  Dispense: 30 capsule; Refill: 0  -     Phentermine HCl; Take 1 capsule (30 mg total) by mouth every morning.  Dispense: 30 capsule; Refill: 0  -     Phentermine HCl; Take 1 capsule (30 mg total) by mouth every morning.  Dispense: 30 capsule; Refill: 0  3. Anxiety  -     FLUoxetine HCl; Take 1 capsule (10 mg total) by mouth daily for 7 days, THEN 2 capsules (20 mg total) daily.  Dispense: 187 capsule; Refill: 0    Return in about 3 months (around 2/7/2025) for follow-up.    BMI>31  - she is tolerating phentermine well  - she reports that she is down about 1-2 lbs since starting this medication 2 weeks ago  - a shared decision was try a higher dose of this medication after she finishes her 4 week course of phentermine 15 mg. We discussed the R/B/A of this dose adjustment for which to monitor   - recommended follow-up in 3 months or earlier if needed    Anxiety  - a shared decision was made to resume her fluoxetine    Patient verbalized understanding of assessment and recommendations. All questions and concerns were addressed.    Telehealth appointment with video and audio was scheduled and completed with the patient's informed consent. Telehealth appointment took place in  context of CDC social distancing guidelines during the Covid-19 pandemic.    Electronically signed by Adonay Moya MD       [1]   Outpatient Medications Marked as Taking for the 11/7/24 encounter (Telemedicine) with Adonay Moya MD   Medication Sig Dispense Refill    FLUoxetine 10 MG Oral Cap Take 1 capsule (10 mg total) by mouth daily for 7 days, THEN 2 capsules (20 mg total) daily. 187 capsule 0    [START ON 1/3/2025] Phentermine HCl 30 MG Oral Cap Take 1 capsule (30 mg total) by mouth every morning. 30 capsule 0    [START ON 12/5/2024] Phentermine HCl 30 MG Oral Cap Take 1 capsule (30 mg total) by mouth every morning. 30 capsule 0    Phentermine HCl 30 MG Oral Cap Take 1 capsule (30 mg total) by mouth every morning. 30 capsule 0   [2]   Allergies  Allergen Reactions    Seasonal

## 2025-01-25 ENCOUNTER — HOSPITAL ENCOUNTER (EMERGENCY)
Age: 25
Discharge: HOME | End: 2025-01-25
Attending: EMERGENCY MEDICINE
Payer: COMMERCIAL

## 2025-01-25 VITALS
SYSTOLIC BLOOD PRESSURE: 127 MMHG | DIASTOLIC BLOOD PRESSURE: 90 MMHG | OXYGEN SATURATION: 99 % | BODY MASS INDEX: 28.17 KG/M2 | HEART RATE: 88 BPM | HEIGHT: 64 IN | RESPIRATION RATE: 16 BRPM | TEMPERATURE: 98 F | WEIGHT: 165 LBS

## 2025-01-25 DIAGNOSIS — S39.012A STRAIN OF LUMBAR REGION, INITIAL ENCOUNTER: Primary | ICD-10-CM

## 2025-01-25 DIAGNOSIS — M54.32 SCIATICA OF LEFT SIDE: ICD-10-CM

## 2025-01-25 LAB
B-HCG UR QL: NEGATIVE
BILIRUB UR QL STRIP.AUTO: NEGATIVE
CLARITY UR REFRACT.AUTO: CLEAR
COLOR UR AUTO: YELLOW
GLUCOSE UR STRIP.AUTO-MCNC: NEGATIVE MG/DL
KETONES UR STRIP.AUTO-MCNC: NEGATIVE MG/DL
LEUKOCYTE ESTERASE UR QL STRIP.AUTO: NEGATIVE
NITRITE UR QL STRIP.AUTO: NEGATIVE
PH UR STRIP.AUTO: 6 [PH] (ref 5–8)
PROT UR STRIP.AUTO-MCNC: NEGATIVE MG/DL
RBC UR QL AUTO: NEGATIVE
SP GR UR STRIP.AUTO: 1.02 (ref 1–1.03)
UROBILINOGEN UR STRIP.AUTO-MCNC: 0.2 MG/DL

## 2025-01-25 PROCEDURE — 99283 EMERGENCY DEPT VISIT LOW MDM: CPT

## 2025-01-25 PROCEDURE — 81025 URINE PREGNANCY TEST: CPT

## 2025-01-25 PROCEDURE — 81003 URINALYSIS AUTO W/O SCOPE: CPT | Performed by: EMERGENCY MEDICINE

## 2025-01-25 RX ORDER — PREDNISONE 20 MG/1
60 TABLET ORAL DAILY
Qty: 15 TABLET | Refills: 0 | Status: SHIPPED | OUTPATIENT
Start: 2025-01-25 | End: 2025-01-30

## 2025-01-25 RX ORDER — CYCLOBENZAPRINE HCL 10 MG
10 TABLET ORAL 3 TIMES DAILY PRN
Qty: 20 TABLET | Refills: 0 | Status: SHIPPED | OUTPATIENT
Start: 2025-01-25 | End: 2025-02-01

## 2025-01-25 NOTE — ED PROVIDER NOTES
Patient Seen in: [facility] In [place]      History     Chief Complaint   Patient presents with    Back Pain     Stated Complaint: Left lower back pain since last night. Radiates down leg.    Subjective:   HPI      Patient is a 24-year-old female presenting for evaluation of left-sided low back pain that radiates into the hip, going down the left leg.  Started yesterday when she was just standing, no trauma, injury or movement of any kind.  Couple times when it has been bad she got lightheaded like she was going to pass out.  It does radiate a little bit into the musculature around the hip and last night transiently went all the way down her leg to her foot.    Objective:     Past Medical History:    Anxiety    Cancer screening    The Empower™ Multi-Cancer panel (40 genes) = Negative              History reviewed. No pertinent surgical history.             Social History     Socioeconomic History    Marital status: Single   Tobacco Use    Smoking status: Never    Smokeless tobacco: Never    Tobacco comments:     Infrequent vaping   Vaping Use    Vaping status: Some Days    Substances: Nicotine    Devices: Disposable   Substance and Sexual Activity    Alcohol use: Yes     Alcohol/week: 6.0 standard drinks of alcohol     Types: 3 Glasses of wine, 3 Cans of beer per week     Comment: Occ     Drug use: No    Sexual activity: Yes     Partners: Male     Birth control/protection: OCP, Condom   Other Topics Concern    Caffeine Concern No    Occupational Exposure No    Hobby Hazards No    Sleep Concern Yes     Comment: averages 4-5 hours sleep during week, more weekends    Stress Concern No    Weight Concern Yes    Special Diet No    Exercise Yes     Comment: 3-4 times per week    Seat Belt Yes     Comment: always    Grew up on a farm No    History of tanning Yes    Outdoor occupation No    Breast feeding No    Reaction to local anesthetic No    Pt has a pacemaker No    Pt has a defibrillator No    Social History Narrative    Freshman in college. Plans dance competition, in [employer], finance major. Enjoys architecture.                   Physical Exam     ED Triage Vitals [01/25/25 1538]   BP (!) 176/104   Pulse 108   Resp 18   Temp 98.3 °F (36.8 °C)   Temp src Oral   SpO2 100 %   O2 Device None (Room air)       Current Vitals:   Vital Signs  BP: 127/90  Pulse: 88  Resp: 16  Temp: 98.3 °F (36.8 °C)  Temp src: Oral    Oxygen Therapy  SpO2: 99 %  O2 Device: None (Room air)        Physical Exam  Vitals and nursing note reviewed.   Constitutional:       Appearance: She is well-developed.   HENT:      Head: Normocephalic and atraumatic.   Eyes:      Conjunctiva/sclera: Conjunctivae normal.      Pupils: Pupils are equal, round, and reactive to light.   Cardiovascular:      Rate and Rhythm: Normal rate and regular rhythm.      Heart sounds: Normal heart sounds.   Pulmonary:      Effort: Pulmonary effort is normal.      Breath sounds: Normal breath sounds.   Abdominal:      General: Bowel sounds are normal.      Palpations: Abdomen is soft.   Musculoskeletal:         General: Normal range of motion.      Cervical back: Normal range of motion and neck supple.      Comments: Mild reproducible tenderness in the left lumbar paraspinal muscles.   Skin:     General: Skin is warm and dry.   Neurological:      Mental Status: She is alert and oriented to person, place, and time.             ED Course     Labs Reviewed   POCT PREGNANCY URINE - Normal   URINALYSIS WITH CULTURE REFLEX                   MDM      24-year-old female presenting with left lower back pain, radiating a bit in the musculature around the hip and intrinsically down her leg to the foot.  Foot had some numbness although does not now.  No leg weakness.  No trauma or injury.  Sounds most suspicious for muscle spasm +/- a degree of lumbar radiculopathy, just  strange that started spontaneously when she was just standing around.  In light of that will check  urine dip to rule out any hematuria that might suggest renal stone.    Update at 4:50 PM.  Urinalysis is unremarkable.  I am not concerned for renal stone, infection or other pathology.  There are no signs or symptoms concerning for cauda equina syndrome and she does not need an emergent MRI.  No trauma and there is no indication for plain x-rays.  Will be treated with prednisone, cyclobenzaprine, Tylenol and ibuprofen, follow-up with PMD.      Past Medical History-none    Differential diagnosis before testing included muscle spasm, lumbar strain, radiculopathy, kidney stone, pyelonephritis    Co-morbidities that add to the complexity of management include: None    Testing ordered during this visit included UA, hCG            Disposition:        Discharge  I have discussed with the patient the results of test, differential diagnosis, treatment plan, warning signs and symptoms which should prompt immediate return.  They expressed understanding of these instructions and agrees to the following plan provided.  They were given written discharge instructions and agrees to return for any concerns and voiced understanding and all questions were answered.      Medical Decision Making      Disposition and Plan     Clinical Impression:  1. Strain of lumbar region, initial encounter    2. Sciatica of left side         Disposition:  Discharge  1/25/2025  4:52 pm    Follow-up:  Adonay Moya MD  [id number] [address] [place] [address] [id number] [phone]    Follow up            Medications Prescribed:  Current Discharge Medication List        START taking these medications    Details   cyclobenzaprine 10 MG Oral Tab Take 1 tablet (10 mg total) by mouth 3 (three) times daily as needed for Muscle spasms.  Qty: 20 tablet, Refills: 0      predniSONE 20 MG Oral Tab Take 3 tablets (60 mg total) by mouth daily for 5 days.  Qty: 15 tablet, Refills: 0                 Supplementary Documentation:

## 2025-01-25 NOTE — DISCHARGE INSTRUCTIONS
Cyclobenzaprine up to 3 times per day as needed for pain.  This may make you drowsy so do not work or drive when taking.  Prednisone once daily.  Okay to take both Tylenol and ibuprofen with the prescription medications.

## 2025-01-25 NOTE — ED INITIAL ASSESSMENT (HPI)
Patient complains of lower back pain concentrated on her left side since yesterday. Patient states left leg is also painful. No urinary symptoms reported. No injury reported. Patient ambulatory in triage.

## 2025-01-28 ENCOUNTER — OFFICE VISIT (OUTPATIENT)
Dept: FAMILY MEDICINE CLINIC | Facility: CLINIC | Age: 25
End: 2025-01-28
Payer: COMMERCIAL

## 2025-01-28 VITALS
BODY MASS INDEX: 29.19 KG/M2 | SYSTOLIC BLOOD PRESSURE: 118 MMHG | DIASTOLIC BLOOD PRESSURE: 72 MMHG | HEIGHT: 64 IN | HEART RATE: 112 BPM | OXYGEN SATURATION: 99 % | TEMPERATURE: 97 F | RESPIRATION RATE: 16 BRPM | WEIGHT: 171 LBS

## 2025-01-28 DIAGNOSIS — M54.50 ACUTE LEFT-SIDED LOW BACK PAIN, UNSPECIFIED WHETHER SCIATICA PRESENT: Primary | ICD-10-CM

## 2025-01-28 PROCEDURE — 3008F BODY MASS INDEX DOCD: CPT | Performed by: FAMILY MEDICINE

## 2025-01-28 PROCEDURE — 3074F SYST BP LT 130 MM HG: CPT | Performed by: FAMILY MEDICINE

## 2025-01-28 PROCEDURE — 99212 OFFICE O/P EST SF 10 MIN: CPT | Performed by: FAMILY MEDICINE

## 2025-01-28 PROCEDURE — 3078F DIAST BP <80 MM HG: CPT | Performed by: FAMILY MEDICINE

## 2025-01-29 NOTE — PROGRESS NOTES
Subjective:   Brandy Ybarra is a 24 year old female who presents for Low Back Pain (Er follow up -- lower left back pain x 5 days - abd cramping and left pelvic pain with in last day. )       History/Other:    Chief Complaint Reviewed and Verified  Nursing Notes Reviewed and   Verified  Allergies Reviewed  Medications Reviewed  Problem List   Reviewed         Tobacco:  Social History     Tobacco Use   Smoking Status Never   Smokeless Tobacco Never   Tobacco Comments    Infrequent vaping     E-Cigarettes/Vaping       Questions Responses    E-Cigarette Use Current Some Day User          E-Cigarette/Vaping Substances       Questions Responses    Nicotine Yes          E-Cigarette/Vaping Devices       Questions Responses    Disposable Yes           Tobacco cessation counseling       Current Outpatient Medications   Medication Sig Dispense Refill    FLUoxetine 10 MG Oral Cap Take 1 capsule (10 mg total) by mouth daily for 7 days, THEN 2 capsules (20 mg total) daily. 187 capsule 0    Phentermine HCl 30 MG Oral Cap Take 1 capsule (30 mg total) by mouth every morning. 30 capsule 0    BIOTIN OR Take by mouth.      Norethindrone-Eth Estradiol (NECON 1/35, 28,) 1-35 MG-MCG Oral Tab Take 1 tablet by mouth daily. 28 tablet 8    Magnesium 100 MG Oral Tab Take by mouth.      Cholecalciferol (VITAMIN D3) 25 MCG (1000 UT) Oral Cap Take 1 tablet by mouth daily.      Probiotic Product (CULTURELLE PROBIOTICS OR) Take by mouth.      cyclobenzaprine 10 MG Oral Tab Take 1 tablet (10 mg total) by mouth 3 (three) times daily as needed for Muscle spasms. (Patient not taking: Reported on 1/28/2025) 20 tablet 0    FLUoxetine HCl 20 MG Oral Cap Take 1 capsule (20 mg total) by mouth daily. (Patient not taking: Reported on 1/28/2025)           Review of Systems:  Review of Systems      Objective:   /72   Pulse 112   Temp 97 °F (36.1 °C) (Temporal)   Resp 16   Ht 5' 4\" (1.626 m)   Wt 171 lb (77.6 kg)   LMP 01/05/2025 (Exact  Date)   SpO2 99%   BMI 29.35 kg/m²  Estimated body mass index is 29.35 kg/m² as calculated from the following:    Height as of this encounter: 5' 4\" (1.626 m).    Weight as of this encounter: 171 lb (77.6 kg).  Physical Exam      Assessment & Plan:   1. Acute left-sided low back pain, unspecified whether sciatica present (Primary)        No follow-ups on file.    YESSY Ya, 1/29/2025, 8:41 AM

## 2025-04-09 ENCOUNTER — PATIENT MESSAGE (OUTPATIENT)
Dept: FAMILY MEDICINE CLINIC | Facility: CLINIC | Age: 25
End: 2025-04-09

## 2025-05-05 RX ORDER — PHENTERMINE HYDROCHLORIDE 30 MG/1
30 CAPSULE ORAL EVERY MORNING
Qty: 30 CAPSULE | Refills: 0 | OUTPATIENT
Start: 2025-05-05

## 2025-08-19 ENCOUNTER — TELEPHONE (OUTPATIENT)
Facility: CLINIC | Age: 25
End: 2025-08-19

## 2025-08-29 ENCOUNTER — TELEPHONE (OUTPATIENT)
Dept: FAMILY MEDICINE CLINIC | Facility: CLINIC | Age: 25
End: 2025-08-29

## (undated) NOTE — Clinical Note
Date: 2/9/2017    Patient Name: Horacio Browning          To Whom it may concern: This letter has been written at the patient's request. The above patient was seen at the [facility] for treatment of a medical condition.     The patient may

## (undated) NOTE — MR AVS SNAPSHOT
After Visit Summary   6/7/2024    Brandy Ybarra   MRN: [id number]           Visit Information     Date & Time  6/7/2024 12:00 PM Provider  Tracie Watson MD [facility] - OB/GYN Dept. Phone  [phone]      Your Vitals Were  Most recent update: 6/7/2024 12:29 PM    BP   120/80 (BP Location: Left arm, Patient Position: Sitting)          Pulse   108          Ht   64\"          Wt   179 lb 6.4 oz          LMP   06/04/2024 (Exact Date)             Breastfeeding   No    BMI   30.79 kg/m²         Allergies as of 6/7/2024  Review status set to Review Complete on 6/7/2024       Noted Reaction Type Reactions    Seasonal 08/05/2013          Your Current Medications        Dosage    Magnesium 100 MG Oral Tab Take by mouth.    Cholecalciferol (VITAMIN D3) 25 MCG (1000 UT) Oral Cap Take 1 tablet by mouth daily.    Probiotic Product (CULTURELLE PROBIOTICS OR) Take by mouth.    Drospirenone-Ethinyl Estradiol (NIMO) 3-0.02 MG Oral Tab Take 1 tablet by mouth daily.    FLUoxetine HCl 20 MG Oral Cap Take 1 capsule (20 mg total) by mouth daily.      Diagnoses for This Visit    Well woman exam with routine gynecological exam   [109835]  -  Primary  Irregular menses   [197765]    Screen for STD (sexually transmitted disease)   [083435]    Mastalgia   [902919]    Papanicolaou smear for cervical cancer screening   [009299]             We Ordered the Following     Normal Orders This Visit    Chlamydia/Gc Amplification [6857572 CUSTOM]     Image-Guided Pap Smear ([facility]) [PML0778 CPT(R)]     ThinPrep PAP with HPV Reflex Request B [[id number] CUSTOM]     Future Labs/Procedures Expected by Expires    17-ALPHA-HYDROXYPROGESTERONE [18911][Q] [2277255 CUSTOM]  6/7/2024 (Approximate) 6/7/2025    Chlamydia/Gc Amplification [1246179 CUSTOM]  6/7/2024 (Approximate) 6/7/2025    Dehydroepiandrosterone Sulfate [9871129 CUSTOM]  6/7/2024 (Approximate) 6/7/2025    HIV AG AB COMBO [[id number]  CUSTOM]  6/7/2024 (Approximate) 6/7/2025    RALPH LEVY 2D+3D DIAGNOSTIC ADDL VWS LEFT (CPT=77065/18351) [COMBO CPT(R)]  6/7/2024 (Approximate) 6/7/2025    OLGA LIDIA Carter Hereditary Cancer Screen [[id number] CUSTOM]  6/7/2024 (Approximate) 6/7/2025    Prolactin [8898179 CUSTOM]  6/7/2024 (Approximate) 6/7/2025    RPR W/CONF [74840] [Q] [9262577 CUSTOM]  6/7/2024 (Approximate) 6/7/2025    TESTOSTERONE TOTAL [873] [Q] [5902687 CUSTOM]  6/7/2024 (Approximate) 6/7/2025    ThinPrep PAP with HPV Reflex Request B [[id number] CUSTOM]  6/7/2024 6/7/2025    TSH W REFLEX TO FREE T4 [47005][Q] [5813473 CUSTOM]  6/7/2024 (Approximate) 6/7/2025    US BREAST LEFT COMPLETE (CPT=76641) [96251 CPT(R)]  6/7/2024 (Approximate) 6/7/2025      Imaging Scheduling Instructions     Around June 7, 2024   Imaging:   RALPH LEVY 2D+3D DIAGNOSTIC ADDL VWS LEFT (NNQ=63874/28734)    Instructions: To schedule an appointment for your radiology test please call [facility] Central Scheduling at [phone].        Around June 7, 2024   Imaging:   US BREAST LEFT COMPLETE (CPT=76641)    Instructions: To schedule an appointment for your radiology test please call [facility] Central Scheduling at [phone].                    Did you know that [facility] primary care physicians now offer Video Visits through [employer] for adult patients for a variety of conditions such as allergies, back pain and cold symptoms? Skip the drive and waiting room and online chat with a doctor face-to-face using your web-cam enabled computer or mobile device wherever you are. Video Visits cost $50 and can be paid hassle-free using a credit, debit, or health savings card.  Not active on [employer]? Ask us how to get signed up today!          If you receive a survey from [employer], please take a few minutes to complete it and provide feedback. We strive to deliver the best patient experience and are looking for ways to make improvements. Your feedback will help us do so. For more  information on [employer], please visit [email or web address]           No text in SmartText           No text in SmartText

## (undated) NOTE — MR AVS SNAPSHOT
[address]  [address]  [address] [address]  [phone]               Thank you for choosing us for your health care visit with Jane Vaca NP.   We are glad to serve you and happy to provide you with this summary give oral rehydration solution, which is available from groceries and drugstores without a prescription.  For children older than 1 year, give plenty of fluids like water, juice, ginger ale, lemonade, fruit-based drinks, or popsicles.    · Food. If your chi table salt in 1 cup of water. · Fever. You may give your child acetaminophen or ibuprofen to control pain and fever, unless another medicine was prescribed for this.  If your child has chronic liver or kidney disease or ever had a stomach ulcer or GI bleed · Unusual fussiness or drowsiness  · Confusion  Date Last Reviewed: 9/25/2015  © 9639-7664 [address], [address] [place] [address]. All rights reserved.  This information is not intended as a substitute for professional medical fruit drinks; electrolyte replacement and sports drinks; and decaffeinated teas and coffee. If you have been diagnosed with a kidney disease, ask your doctor how much and what types of fluids you should drink to prevent dehydration.  If you have kidney dise 120/60 mmHg (79 %, Z = 0.81 / 29 %, Z = -0.55*) 107 98.2 °F (36.8 °C) (Oral) 156 lb (88 %*, Z = 1.20)      *Growth percentiles are based on CDC 2-20 Years data     BP percentiles are based on 2000 NHANES data         Current Medications          This list

## (undated) NOTE — MR AVS SNAPSHOT
[facility] [address] [place] [address] [phone]               Thank you for choosing us for your health care visit with Ryanne Perla MD.  We are glad to serve you and happy to provide you with this Take  by mouth. Norethin Ace-Eth Estrad-FE 1-20 MG-MCG Tabs   Take 1 tablet by mouth once daily.    Commonly known as:  LUCIO CARTY 1/20                Where to Get Your Medications      These medications were sent to [id number] - [place] o 1 or more hours of physical activity a day    To help children live healthy active lives, parents can:  o Be role models themselves by making healthy eating and daily physical activity the norm for their family.   o Create a home where healthy choices are

## (undated) NOTE — Clinical Note
Dear Dr. Katy Wolfe,       Thank you for referring Christopher Romano to the [facility].   Sincerely,  JIMI Toscano